# Patient Record
Sex: FEMALE | Race: WHITE | NOT HISPANIC OR LATINO | Employment: UNEMPLOYED
[De-identification: names, ages, dates, MRNs, and addresses within clinical notes are randomized per-mention and may not be internally consistent; named-entity substitution may affect disease eponyms.]

---

## 2017-01-10 ENCOUNTER — MYAURORA ACCOUNT LINK (OUTPATIENT)
Dept: OTHER | Age: 47
End: 2017-01-10

## 2017-01-10 ENCOUNTER — CHARTING TRANS (OUTPATIENT)
Dept: URGENT CARE | Age: 47
End: 2017-01-10

## 2017-05-25 ENCOUNTER — CHARTING TRANS (OUTPATIENT)
Dept: OTHER | Age: 47
End: 2017-05-25

## 2017-05-26 ENCOUNTER — CHARTING TRANS (OUTPATIENT)
Dept: FAMILY MEDICINE | Age: 47
End: 2017-05-26

## 2018-04-19 ENCOUNTER — CHARTING TRANS (OUTPATIENT)
Dept: OTHER | Age: 48
End: 2018-04-19

## 2018-04-19 ENCOUNTER — MYAURORA ACCOUNT LINK (OUTPATIENT)
Dept: OTHER | Age: 48
End: 2018-04-19

## 2018-04-19 ENCOUNTER — LAB SERVICES (OUTPATIENT)
Dept: OTHER | Age: 48
End: 2018-04-19

## 2018-04-19 LAB
A/G RATIO: 1.32 (ref 1.1–2.4)
ALANINE AMINOTRANSFE: 21 U/L (ref 7–52)
ALBUMIN, SERUM (ALB): 4.1 G/DL (ref 3.5–5.7)
ALKALINE PHOSPHATASE (ALK): 63 U/L (ref 34–104)
ANION GAP: 7 MEQ/L (ref 8–16)
ASPARTATE AMINOTRANS: 19 U/L (ref 13–39)
BASOPHIL AUTOMATED: 0.5 %
BASOPHILS: 0 (ref 0–0.2)
BILIRUBIN, TOTAL: 0.4 MG/DL (ref 0.2–0.8)
BLOOD UREA NITROGEN (BUN): 16 MG/DL (ref 7–25)
BUN/CREATININE RATIO: 20.3 (ref 7.4–23)
CALCIUM, SERUM: 10.2 MG/DL (ref 8.6–10.3)
CARBON DIOXIDE: 26 MMOL/L (ref 21–31)
CHLORIDE, SERUM: 106 MMOL/L (ref 98–107)
CREATININE: 0.79 MG/DL (ref 0.6–1.2)
EOSINOPHIL AUTOMATED: 2.2 %
EOSINOPHILS: 0.2 (ref 0–0.5)
EST GLOMERULAR FILTRATION RATE: > 60 1.73M SQ
GLUCOSE P FAST SERPL-MCNC: 108 MG/DL (ref 70–99)
HEMATOCRIT: 45.5 % (ref 34.7–45.1)
HEMOGLOBIN: 15.4 GM/DL (ref 12–15.3)
K (POTASSIUM, SERUM): 4.4 MMOL/L (ref 3.5–5.1)
LYMPHOCYTE AUTOMATED: 20.3 %
LYMPHOCYTES: 2 (ref 0.6–3.4)
MEAN CORPUSCULAR HGB: 32.8 PG (ref 26–34)
MEAN CORPUSCULAR HGB: 33.9 G/DL (ref 32.5–35.8)
MEAN CORPUSCULAR VOL: 96.9 FL (ref 80–100)
MEAN PLATELET VOLUME: 10.5 FL (ref 6.8–10.2)
MONOCYTE AUTOMATED: 9.1 %
MONOCYTES: 0.9 (ref 0.3–1)
NA (SODIUM, SERUM): 139 MMOL/L (ref 133–144)
NEUTROPHIL ABSOLUTE: 6.7 (ref 1.7–7.7)
NEUTROPHIL AUTOMATED: 67.9 %
PLATELET COUNT: 201 K/MM3 (ref 150–450)
PROTEIN TOTAL: 7.2 G/DL (ref 6.4–8.9)
RED BLOOD CELL COUNT: 4.7 M/MM3 (ref 3.63–5.04)
RED CELL DISTRIBUTIO: 13.6 % (ref 11.9–15.9)
SEDIMENTATION RATE: 17 MM/HR (ref 0–20)
WHITE BLOOD CELL COU: 9.8 K/MM3 (ref 4–11)

## 2018-04-24 ENCOUNTER — CHARTING TRANS (OUTPATIENT)
Dept: OTHER | Age: 48
End: 2018-04-24

## 2018-04-26 ENCOUNTER — ANCILLARY ORDERS (OUTPATIENT)
Dept: OTHER | Age: 48
End: 2018-04-26

## 2018-04-26 ENCOUNTER — CHARTING TRANS (OUTPATIENT)
Dept: OTHER | Age: 48
End: 2018-04-26

## 2018-04-26 ENCOUNTER — MYAURORA ACCOUNT LINK (OUTPATIENT)
Dept: OTHER | Age: 48
End: 2018-04-26

## 2018-04-26 ENCOUNTER — LAB SERVICES (OUTPATIENT)
Dept: OTHER | Age: 48
End: 2018-04-26

## 2018-04-26 DIAGNOSIS — R26.89 OTHER ABNORMALITIES OF GAIT AND MOBILITY: ICD-10-CM

## 2018-04-26 DIAGNOSIS — R20.0 ANESTHESIA OF SKIN: ICD-10-CM

## 2018-04-26 LAB
BUN SERPL-MCNC: 13 MG/DL (ref 7–20)
CALCIUM SERPL-MCNC: 10.1 MG/DL (ref 8.6–10.6)
CHLORIDE SERPL-SCNC: 104 MMOL/L (ref 96–107)
CO2 SERPL-SCNC: 28 MMOL/L (ref 22–32)
CREAT SERPL-MCNC: 0.8 MG/DL (ref 0.5–1.4)
GFR SERPL CREATININE-BSD FRML MDRD: >60 ML/MIN/{1.73M2}
GFR SERPL CREATININE-BSD FRML MDRD: >60 ML/MIN/{1.73M2}
GLUCOSE SERPL-MCNC: 98 MG/DL (ref 70–200)
POTASSIUM SERPL-SCNC: 4.6 MMOL/L (ref 3.5–5.3)
SODIUM SERPL-SCNC: 143 MMOL/L (ref 136–146)

## 2018-05-07 ENCOUNTER — OFFICE VISIT (OUTPATIENT)
Dept: NEUROLOGY | Facility: CLINIC | Age: 48
End: 2018-05-07

## 2018-05-07 VITALS
RESPIRATION RATE: 16 BRPM | BODY MASS INDEX: 40.29 KG/M2 | SYSTOLIC BLOOD PRESSURE: 140 MMHG | DIASTOLIC BLOOD PRESSURE: 76 MMHG | HEART RATE: 84 BPM | WEIGHT: 236 LBS | HEIGHT: 64 IN

## 2018-05-07 DIAGNOSIS — R25.8 CLONUS: ICD-10-CM

## 2018-05-07 DIAGNOSIS — M54.6 ACUTE MIDLINE THORACIC BACK PAIN: ICD-10-CM

## 2018-05-07 DIAGNOSIS — R29.2 HYPER REFLEXIA: ICD-10-CM

## 2018-05-07 DIAGNOSIS — G95.9 MYELOPATHY (HCC): ICD-10-CM

## 2018-05-07 DIAGNOSIS — R29.898 RIGHT LEG WEAKNESS: Primary | ICD-10-CM

## 2018-05-07 PROCEDURE — 99244 OFF/OP CNSLTJ NEW/EST MOD 40: CPT | Performed by: OTHER

## 2018-05-07 NOTE — PATIENT INSTRUCTIONS
Refill policies:    • Allow 2-3 business days for refills; controlled substances may take longer.   • Contact your pharmacy at least 5 days prior to running out of medication and have them send an electronic request or submit request through the “request re entire amount billed. Precertification and Prior Authorizations: If your physician has recommended that you have a procedure or additional testing performed.   [facility]) will contact your insurance carrier to obtain pre-certi

## 2018-05-07 NOTE — H&P
[facility] New Patient / Consult Visit    Bud Morales is a 49year old female.                          Referring MD: Tai Goldstein    Patient presents with:  New Patient  Numbness: and trouble walking       HPI:    Bud Morales is a 49 had 2   No date: KNEE SURGERY Left  Smoking status: Current Every Day Smoker                                                   Packs/day: 0.00      Years: 0.00      Smokeless tobacco: Never Used                      Alcohol use:  Yes             History rev uvula  Spinal accessory:   Shoulder Shrug: normal bilaterally   Lateral head turn: normal bilaterally  Hypoglossal:   Tongue movement: protrusion is midline with normal lateral movements    Motor System:  Strength: 5/5 throughout, except 4/5 R Hip flexion, check MRI cervical and thoracic spine with and without contrast to further evaluate for possible demyelinating disease and/or myelopathy.   In addition, will send off serologic studies for mimickers of demyelinating disease.     (R2.128) Right leg weakness FACTOR, SED RATE, WESTERGREN (AUTOMATED),         ASSAY, THYROID STIM HORMONE, VITAMIN B12,         MONOCLONAL PROTEIN STUDY        As noted above    Return in about 1 month (around 6/7/2018). Copy of note was sent to referring physician.       Júnior Torres

## 2018-05-17 ENCOUNTER — TELEPHONE (OUTPATIENT)
Dept: NEUROLOGY | Facility: CLINIC | Age: 48
End: 2018-05-17

## 2018-05-17 DIAGNOSIS — M54.6 ACUTE MIDLINE THORACIC BACK PAIN: ICD-10-CM

## 2018-05-17 DIAGNOSIS — R29.898 RIGHT LEG WEAKNESS: Primary | ICD-10-CM

## 2018-05-17 DIAGNOSIS — R25.8 CLONUS: ICD-10-CM

## 2018-05-17 DIAGNOSIS — G95.9 MYELOPATHY (HCC): ICD-10-CM

## 2018-05-17 NOTE — TELEPHONE ENCOUNTER
S: Calling with symptoms    B: Last seen 5/7 with plan to MRI brain, cervical, thoracic. MRI scheduled for 5/26.     Vale Villatoro is a 49year old female with no significant past medical history, who presents for evaluation of new onset of right lower extr

## 2018-05-22 ENCOUNTER — APPOINTMENT (OUTPATIENT)
Dept: MRI IMAGING | Facility: HOSPITAL | Age: 48
DRG: 059 | End: 2018-05-22
Attending: Other
Payer: COMMERCIAL

## 2018-05-22 ENCOUNTER — HOSPITAL ENCOUNTER (OUTPATIENT)
Dept: MRI IMAGING | Facility: HOSPITAL | Age: 48
Discharge: HOME | DRG: 059 | End: 2018-05-22
Attending: Other
Payer: COMMERCIAL

## 2018-05-22 ENCOUNTER — LAB ENCOUNTER (OUTPATIENT)
Dept: LAB | Facility: HOSPITAL | Age: 48
DRG: 059 | End: 2018-05-22
Attending: Other
Payer: COMMERCIAL

## 2018-05-22 ENCOUNTER — HOSPITAL ENCOUNTER (INPATIENT)
Dept: MRI IMAGING | Facility: HOSPITAL | Age: 48
LOS: 2 days | Discharge: HOME | DRG: 059 | End: 2018-05-25
Attending: HOSPITALIST | Admitting: HOSPITALIST
Payer: COMMERCIAL

## 2018-05-22 DIAGNOSIS — M54.6 ACUTE MIDLINE THORACIC BACK PAIN: ICD-10-CM

## 2018-05-22 DIAGNOSIS — R25.8 CLONUS: ICD-10-CM

## 2018-05-22 DIAGNOSIS — R29.898 RIGHT LEG WEAKNESS: ICD-10-CM

## 2018-05-22 DIAGNOSIS — G95.9 MYELOPATHY (HCC): ICD-10-CM

## 2018-05-22 DIAGNOSIS — G35 MULTIPLE SCLEROSIS (HCC): Primary | ICD-10-CM

## 2018-05-22 DIAGNOSIS — R29.2 HYPER REFLEXIA: ICD-10-CM

## 2018-05-22 PROCEDURE — 86431 RHEUMATOID FACTOR QUANT: CPT

## 2018-05-22 PROCEDURE — 70553 MRI BRAIN STEM W/O & W/DYE: CPT | Performed by: OTHER

## 2018-05-22 PROCEDURE — A9576 INJ PROHANCE MULTIPACK: HCPCS | Performed by: OTHER

## 2018-05-22 PROCEDURE — 84165 PROTEIN E-PHORESIS SERUM: CPT

## 2018-05-22 PROCEDURE — 85652 RBC SED RATE AUTOMATED: CPT

## 2018-05-22 PROCEDURE — 86334 IMMUNOFIX E-PHORESIS SERUM: CPT

## 2018-05-22 PROCEDURE — 83883 ASSAY NEPHELOMETRY NOT SPEC: CPT

## 2018-05-22 PROCEDURE — 84443 ASSAY THYROID STIM HORMONE: CPT

## 2018-05-22 PROCEDURE — 86225 DNA ANTIBODY NATIVE: CPT

## 2018-05-22 PROCEDURE — 86140 C-REACTIVE PROTEIN: CPT

## 2018-05-22 PROCEDURE — 86038 ANTINUCLEAR ANTIBODIES: CPT

## 2018-05-22 PROCEDURE — 36415 COLL VENOUS BLD VENIPUNCTURE: CPT

## 2018-05-22 PROCEDURE — 86235 NUCLEAR ANTIGEN ANTIBODY: CPT

## 2018-05-22 PROCEDURE — 82607 VITAMIN B-12: CPT

## 2018-05-23 ENCOUNTER — TELEPHONE (OUTPATIENT)
Dept: NEUROLOGY | Facility: CLINIC | Age: 48
End: 2018-05-23

## 2018-05-23 PROBLEM — E66.01 MORBID OBESITY (HCC): Status: ACTIVE | Noted: 2018-05-23

## 2018-05-23 PROBLEM — F17.200 TOBACCO DEPENDENCE: Status: ACTIVE | Noted: 2018-05-23

## 2018-05-23 PROCEDURE — 99223 1ST HOSP IP/OBS HIGH 75: CPT | Performed by: INTERNAL MEDICINE

## 2018-05-23 RX ORDER — ACETAMINOPHEN 325 MG/1
650 TABLET ORAL EVERY 6 HOURS PRN
Status: DISCONTINUED | OUTPATIENT
Start: 2018-05-23 | End: 2018-05-25

## 2018-05-23 RX ORDER — NICOTINE 21 MG/24HR
1 PATCH, TRANSDERMAL 24 HOURS TRANSDERMAL DAILY
Status: DISCONTINUED | OUTPATIENT
Start: 2018-05-23 | End: 2018-05-25

## 2018-05-23 RX ORDER — ALPRAZOLAM 0.25 MG/1
0.12 TABLET ORAL 2 TIMES DAILY PRN
Status: DISCONTINUED | OUTPATIENT
Start: 2018-05-23 | End: 2018-05-25

## 2018-05-23 RX ORDER — ENOXAPARIN SODIUM 100 MG/ML
0.5 INJECTION SUBCUTANEOUS DAILY
Status: DISCONTINUED | OUTPATIENT
Start: 2018-05-23 | End: 2018-05-25

## 2018-05-23 RX ORDER — ENOXAPARIN SODIUM 100 MG/ML
40 INJECTION SUBCUTANEOUS DAILY
Status: DISCONTINUED | OUTPATIENT
Start: 2018-05-23 | End: 2018-05-23

## 2018-05-23 RX ORDER — ALPRAZOLAM 0.25 MG/1
0.25 TABLET ORAL 3 TIMES DAILY PRN
COMMUNITY

## 2018-05-23 RX ADMIN — NICOTINE 1 PATCH: 21 MG/24HR PATCH, TRANSDERMAL 24 HOURS TRANSDERMAL at 21:17:00

## 2018-05-23 NOTE — TELEPHONE ENCOUNTER
Per Dr. Beverley Ross: MRIs are suggestive of possible demyelinating disease but also show some tightness in cervical cord. Recommending direct admission to address both possible neurological diagnosis as well as neurosurgerical consult for evaluation.

## 2018-05-23 NOTE — TELEPHONE ENCOUNTER
Marsha Salinas informed of results and recommendations. She is interested in admission to complete workup and initiate treatment, but needs to discuss with her family before committing to plan. She will call back to let office know of her decision.

## 2018-05-23 NOTE — TELEPHONE ENCOUNTER
Call Registration at [phone] to confirm patient's insurance coverage. For Non [facility] patient, [facility], Dr. Monse shafer ([phone]) & informed of direct admission for MS. Orders given.   Nursing Supervisor (ext [phone]), Jonathan Oliveros RN noti

## 2018-05-23 NOTE — PROGRESS NOTES
[address] [place] [address] with [facility]  5/23/2018    5:11 PM      Patient of my associated Dr Dom Ahumada who has abnormal MRI brain and cervical cord, demyelinating disease is suspected and she will receive solumedr

## 2018-05-23 NOTE — H&P
[facility]  History and Physical     Nadeem Cage Sula Patient Status:  Inpatient    7/15/1970 MRN [id number]   [facility] 7NE-A Attending Nora Perkins [address] Day # 0 PCP [facility] SPECIALTY [facility]     Chief Complaint: right leg /67 (BP Location: Left arm)   Pulse 75   Temp 98.9 °F (37.2 °C) (Oral)   Resp 18   LMP 05/07/2018   SpO2 99%   General: No acute distress. Alert and oriented x 3. HEENT: Normocephalic atraumatic. Moist mucous membranes. EOM-I. PERRLA. Anicteric.

## 2018-05-24 ENCOUNTER — APPOINTMENT (OUTPATIENT)
Dept: GENERAL RADIOLOGY | Facility: HOSPITAL | Age: 48
DRG: 059 | End: 2018-05-24
Attending: Other
Payer: COMMERCIAL

## 2018-05-24 PROCEDURE — 009U3ZX DRAINAGE OF SPINAL CANAL, PERCUTANEOUS APPROACH, DIAGNOSTIC: ICD-10-PCS | Performed by: RADIOLOGY

## 2018-05-24 PROCEDURE — 99253 IP/OBS CNSLTJ NEW/EST LOW 45: CPT | Performed by: NEUROLOGICAL SURGERY

## 2018-05-24 PROCEDURE — 99254 IP/OBS CNSLTJ NEW/EST MOD 60: CPT | Performed by: OTHER

## 2018-05-24 PROCEDURE — 77003 FLUOROGUIDE FOR SPINE INJECT: CPT | Performed by: OTHER

## 2018-05-24 PROCEDURE — 99232 SBSQ HOSP IP/OBS MODERATE 35: CPT | Performed by: INTERNAL MEDICINE

## 2018-05-24 PROCEDURE — 62270 DX LMBR SPI PNXR: CPT | Performed by: OTHER

## 2018-05-24 RX ORDER — PANTOPRAZOLE SODIUM 40 MG/1
40 TABLET, DELAYED RELEASE ORAL
Status: DISCONTINUED | OUTPATIENT
Start: 2018-05-24 | End: 2018-05-25

## 2018-05-24 RX ORDER — ERGOCALCIFEROL 1.25 MG/1
50000 CAPSULE ORAL
Status: DISCONTINUED | OUTPATIENT
Start: 2018-05-24 | End: 2018-05-25

## 2018-05-24 RX ADMIN — ERGOCALCIFEROL 50000 UNITS: 1.25 CAPSULE ORAL at 16:42:00

## 2018-05-24 RX ADMIN — NICOTINE 1 PATCH: 21 MG/24HR PATCH, TRANSDERMAL 24 HOURS TRANSDERMAL at 09:04:00

## 2018-05-24 RX ADMIN — PANTOPRAZOLE SODIUM 40 MG: 40 TABLET, DELAYED RELEASE ORAL at 14:38:00

## 2018-05-24 NOTE — PLAN OF CARE
Assumed care. Patient resting comfortably. Denies pain. IV steriod injection infused. Tolerated well. Plan for LP today. Will monitor. No issues overnight.      Impaired Activities of Daily Living    • Achieve highest/safest level of independence in self ca

## 2018-05-24 NOTE — PROGRESS NOTES
[facility] Progress Note:  Anticoagulation Weight Dose Adjustment for enoxaparin (LOVENOX)    Kianna Monroy is a 49year old female who has been prescribed enoxaparin (LOVENOX) for VTE prophylaxis.       Estimated Creatinine Clearance: 73.3 mL/min

## 2018-05-24 NOTE — CM/SW NOTE
05/24/18 1100   CM/SW Screening   Referral Source Social Work (self-referral)     Patient was screened during rounds and no needs are identified at this time. RN to contact SW/CM if needs arise.

## 2018-05-24 NOTE — PAYOR COMM NOTE
--------------  ADMISSION REVIEW     Payor: Jerzy Murphy  #:  [id number]  Authorization Number: [id number]    Admit date: 5/23/18  Admit time: 200       Admitting Physician: Cheryl Da Silva DO  Attending Physician:  Liudmila Ramos extremities. ASSESSMENT / PLAN:     1. Right leg weakness- concerning for demyelinating disease / MS  1. Will need to place on solumedrol- dosing per neurology   2. LP? For further work up   3. Fall precaution   4. PT OT   5. Neurology cosnult   2.  [facility] forearm have improved and that RLE is stronger.        Objective/Physical Exam:     Vital Signs:  Blood pressure 156/84, pulse 105, temperature 98.2 °F (36.8 °C), temperature source Oral, resp.  rate 16, weight 236 lb, last menstrual period 05/07/2018, SpO2 nicotine (NICODERM CQ) 14 MG/24HR 1 patch     Date Action Dose Route User    5/24/2018 0904 Patch Applied 1 patch Transdermal (Right Upper Arm) Noah Blanco RN    5/23/2018 2117 Patch Applied 1 patch Transdermal (Left Upper Arm) Naheed Rivers

## 2018-05-24 NOTE — CONSULTS
[address] [facility] with [facility]  5/24/2018    1:03 PM      50 yo RHF with abnormal MRI brain and cervical cord    HPI:   Around March, she began having burning in the arms and thought nothing of it but then /74 (BP Location: Left arm)   Pulse 87   Temp 97.9 °F (36.6 °C) (Oral)   Resp 18   Wt 236 lb   LMP 05/07/2018   SpO2 97%   BMI 40.51 kg/m²    Appears stated age  HENT:  Pink conjunctiva, anicteric sclerae, moist mucosa  Neck: No adenopathy or thyrome

## 2018-05-24 NOTE — CONSULTS
[facility]  Neurosurgery Consult    [address] Patient Status:  Inpatient    7/15/1970 MRN [id number]   [facility] 7NE-A Attending Adrián Yi  [address] Day # 1 PCP ELIZABETH GARCÍA     REASON FOR CONSULTATION:  Cervica MethylPREDNISolone Sodium Succ (Solu-MEDROL) 500 mg in sodium chloride 0.9 % 100 mL IVPB 500 mg Intravenous QAM   Enoxaparin Sodium (LOVENOX) 60 MG/0.6ML injection 50 mg 0.5 mg/kg Subcutaneous Daily   nicotine (NICODERM CQ) 14 MG/24HR 1 patch 1 patch Tra enhancement. MRI C/T Spine -     Multiple high T2 signal lesions intrinsic to the cervical spinal cord and upper thoracic spinal cord at the C2 through C4 level and C6-7 level as well as the T1 -2 level posteriorly and T8-9 level.   There is some cord en to both her demyelinating process and the evidence of injury to her spinal cord. I emphasized the fact that we cannot predict if and when she will deteriorate or at what rate, but that she is at risk for loss of function.  She has a risk of devastating outc

## 2018-05-24 NOTE — PROGRESS NOTES
[facility]  Progress Note     Pieter Mancilla Patient Status:  Inpatient    7/15/1970 MRN [id number]   [facility] 7NE-A Attending Braulio Jean [address] Day # 1 PCP [facility]     Chief Complaint: right leg weakness weakness- concerning for demyelinating disease / MS  1.  on solumedrol  2. LP For further work up   3. Fall precaution   4. PT OT   5. Neurology recs appreciated    2. Tobacco dependence - tobacco cessation advsied  3.  Probable DEBBIE- sleep study as outpt

## 2018-05-24 NOTE — PROGRESS NOTES
[address]  Neurology Progress Note    Karlo Mancilla Patient Status:  Inpatient    7/15/1970 MRN [id number]   [facility] 7NE-A Attending Ermelinda Thomas [address] Day # 1 PCP ELIZABETH GARCÍA         Subjective:  Armani Dutta small vessel ischemic changes. Largest lesion in the left frontal lobe periventricular white matter measures 7.5 mm. No abnormal enhancement. Large retention cyst right maxillary sinus.             MRI C-Spine 5/22/18:  CONCLUSION:  Multiple high T2 s

## 2018-05-24 NOTE — PROCEDURES
[facility]  Procedure Note    Cornelio Mancilla Patient Status:  Inpatient    7/15/1970 MRN [id number]   [facility] 7NE-A Attending Carmen López*   Hosp Day # 1 PCP ELIZABETH GARCÍA     Procedure: Lumbar Puncture      Anesthe

## 2018-05-25 VITALS
OXYGEN SATURATION: 98 % | RESPIRATION RATE: 18 BRPM | WEIGHT: 236 LBS | HEART RATE: 78 BPM | TEMPERATURE: 98 F | SYSTOLIC BLOOD PRESSURE: 149 MMHG | DIASTOLIC BLOOD PRESSURE: 64 MMHG | BODY MASS INDEX: 41 KG/M2

## 2018-05-25 PROCEDURE — 99232 SBSQ HOSP IP/OBS MODERATE 35: CPT | Performed by: OTHER

## 2018-05-25 PROCEDURE — 99239 HOSP IP/OBS DSCHRG MGMT >30: CPT | Performed by: INTERNAL MEDICINE

## 2018-05-25 RX ORDER — LOSARTAN POTASSIUM 25 MG/1
25 TABLET ORAL DAILY
Status: DISCONTINUED | OUTPATIENT
Start: 2018-05-25 | End: 2018-05-25

## 2018-05-25 RX ORDER — LOSARTAN POTASSIUM 25 MG/1
25 TABLET ORAL DAILY
Qty: 30 TABLET | Refills: 0 | Status: SHIPPED | OUTPATIENT
Start: 2018-05-26 | End: 2018-06-07

## 2018-05-25 RX ORDER — ERGOCALCIFEROL (VITAMIN D2) 1250 MCG
50000 CAPSULE ORAL
Qty: 4 CAPSULE | Refills: 0 | Status: SHIPPED | OUTPATIENT
Start: 2018-05-31 | End: 2018-08-13 | Stop reason: ALTCHOICE

## 2018-05-25 RX ORDER — PREDNISONE 20 MG/1
TABLET ORAL
Qty: 30 TABLET | Refills: 0 | Status: SHIPPED | OUTPATIENT
Start: 2018-05-25 | End: 2018-06-13

## 2018-05-25 RX ADMIN — NICOTINE 1 PATCH: 21 MG/24HR PATCH, TRANSDERMAL 24 HOURS TRANSDERMAL at 09:30:00

## 2018-05-25 RX ADMIN — PANTOPRAZOLE SODIUM 40 MG: 40 TABLET, DELAYED RELEASE ORAL at 07:13:00

## 2018-05-25 RX ADMIN — LOSARTAN POTASSIUM 25 MG: 25 TABLET ORAL at 09:25:00

## 2018-05-25 NOTE — PLAN OF CARE
Patient awake, alert and oriented x4  Telemetry, Sinus Rhythm   Currently denies pain/discomfort  RLE; right thigh weakness/slight numbness/tingling- per patient this is improved since admission  Pedal pulses palpable   IV steroid as ordered  Working with

## 2018-05-25 NOTE — PHYSICAL THERAPY NOTE
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 Pt reports recently she has been crawling up the stairs and scooting down on her bottom. SUBJECTIVE  \"I definitely feel like I can walk better with this walker. \"    Patient self-stated goal is to return home.      OBJECTIVE  P Gait Assistance: Supervision  Distance (ft): 200  Assistive Device: Rolling walker  Pattern: Comment (quick yaneli)  Stoop/Curb Assistance: Not tested       Skilled Therapy Provided: Session approved by RN.  Pt received supine in bed, agreeable to therap impairments and comorbidities manifest themselves as functional limitations in independent bed mobility, transfers, and gait.   The patient is below baseline and would benefit from skilled inpatient PT to address the above deficits to assist patient in retu

## 2018-05-25 NOTE — PROGRESS NOTES
[address] [address] Neurology Progress Note    Obdulia Mancilla Patient Status:  Inpatient    7/15/1970 MRN [id number]   [facility] 7NE-A Attending Delories Seip [address] Day # 2 PCP ELIZABETH GARCÍA         Subjective:  Abrahan Moreno There are several foci of FLAIR and T2 signal abnormality in the periventricular white matter both cerebral hemispheres.  This is in the distribution compatible with demyelinating disease.  Differential includes postinflammatory etiology and chronic   smal Likely d/c today after last steroid dose with Prednisone taper 60/40/20mg daily each x 5 days  Vit D supplementation   Follow up with Dr. Inocencio de leon for further MS management and to follow up on LP results    Dr. Red Sabillon to follow.     Birdie Limon

## 2018-05-25 NOTE — PROGRESS NOTES
[facility]  FCO Progress Note    Viviana Mancilla Patient Status:  Inpatient    7/15/1970 MRN [id number]   [facility] 7NE-A Attending Nelly Phelps MD   Hosp Day # 2 PCP [facility]       Subjective: No new symptoms today, has don

## 2018-05-25 NOTE — OCCUPATIONAL THERAPY NOTE
OCCUPATIONAL THERAPY QUICK EVALUATION - INPATIENT    Room Number: 8321/0275-X  Evaluation Date: 5/25/2018     Type of Evaluation: Quick Eval  Presenting Problem: myelopathy    Physician Order: IP Consult to Occupational Therapy  Reason for Therapy:  ADL/IA [facility])  Active Problems:    Right leg weakness    Tobacco dependence    Morbid obesity ([facility]    Multiple sclerosis ([facility]    Essential hypertension    Vitamin D deficiency    Cervical stenosis of spine      Past Medical History  Past Medical History:   Diagn ASSESSMENT  AM-PAC ‘6-Clicks’ Inpatient Daily Activity Short Form   How much help from another person does the patient currently need…  -   Putting on and taking off regular lower body clothing?: None   -   Bathing (including washing, rinsing, drying)?: No presents with no skilled Occupational Therapy needs at this time. Patient discharged from Occupational Therapy services. Please re-order if a new functional limitation presents during this admission.     Patient was able to achieve the following goals:  P

## 2018-05-25 NOTE — DISCHARGE SUMMARY
[facility]  DISCHARGE SUMMARY     Marleni Mancilla Patient Status:  Inpatient    7/15/1970 MRN [id number]   [facility] 7NE-A Attending Mylene Loja MD   Hosp Day # 2 PCP Rahel Ramos     Date of Admission: 2018  Date of Malia Terry Consultants:  • Neurology, spine surgery     Discharge Medication List:     Discharge Medications      START taking these medications      Instructions Prescription details   ergocalciferol 48651 units Caps  Commonly known as:  ERGOCALCIFEROL  Start gallops. Abdomen: Soft, nontender, nondistended. Positive bowel sounds. No rebound or guarding. Neurologic: +4/5 motor on right LE  Musculoskeletal: Moves all extremities. Extremities: No edema.   -------------------------------------------------------

## 2018-05-25 NOTE — PLAN OF CARE
NURSING DISCHARGE NOTE    Discharged Home via Wheelchair. Accompanied by Spouse and Support staff  Belongings Taken by patient/family. AVS Discharge paperwork completed and reviewed with patient.  Reinforced importance of making and attending follow

## 2018-05-25 NOTE — PLAN OF CARE
Assumed care. Patient resting in bed with friends at bedside. Feeling better. No more nausea and numbness/weakness is slightly improving. She is aware of plan of care, including another steriod infusion this am. Plan for discharge after infusion completed.

## 2018-05-29 NOTE — PAYOR COMM NOTE
--------------  DISCHARGE REVIEW    Payor: Jerzy Jeffrey  #:  [id number]  Authorization Number: [id number]     Admit date: 5/23/18  Admit time:  0553  Discharge Date: 5/25/2018  2:24 PM     Admitting Physician: Kirsty Barton DO

## 2018-05-30 ENCOUNTER — TELEPHONE (OUTPATIENT)
Dept: NEUROLOGY | Facility: CLINIC | Age: 48
End: 2018-05-30

## 2018-05-30 RX ORDER — TIZANIDINE 2 MG/1
2 TABLET ORAL 2 TIMES DAILY
Qty: 60 TABLET | Refills: 5 | Status: SHIPPED | OUTPATIENT
Start: 2018-05-30 | End: 2018-08-13 | Stop reason: ALTCHOICE

## 2018-05-30 NOTE — TELEPHONE ENCOUNTER
Spoke with patient and she relates that tightness in the head  sometimes responds to Xanax. I will send tizanidine 2 mg twice a day as maintenance. Advised to keep follow-up appointment to discuss disease modifying treatment.

## 2018-05-30 NOTE — TELEPHONE ENCOUNTER
S: Calling with condition update after discharge from hospital    B: Admitted to [facility] for diganostic LP, solumedrol 500 mg x 3 days with oral taper (she is on day # 5 of taper, 60 mg / day).      A: She has seen improvement in her symptoms; walking is better

## 2018-06-07 ENCOUNTER — OFFICE VISIT (OUTPATIENT)
Dept: NEUROLOGY | Facility: CLINIC | Age: 48
End: 2018-06-07

## 2018-06-07 VITALS
SYSTOLIC BLOOD PRESSURE: 133 MMHG | WEIGHT: 232 LBS | HEIGHT: 64 IN | BODY MASS INDEX: 39.61 KG/M2 | RESPIRATION RATE: 16 BRPM | HEART RATE: 83 BPM | DIASTOLIC BLOOD PRESSURE: 60 MMHG

## 2018-06-07 DIAGNOSIS — G95.9 MYELOPATHY (HCC): Primary | ICD-10-CM

## 2018-06-07 DIAGNOSIS — G37.9 DEMYELINATING DISEASE (HCC): ICD-10-CM

## 2018-06-07 PROCEDURE — 99214 OFFICE O/P EST MOD 30 MIN: CPT | Performed by: OTHER

## 2018-06-07 NOTE — PROGRESS NOTES
[facility] Progress Note    HPI    Patient presents with:  Numbness:  Follow up after labs      As per my initial H&P from 5/7/18:,   \" Zohra De León is a 49year old, who presents for evaluation of numbness and tingling and walking iss Since last visit, she was admitted for workup after having worsening walking and found to have multiple demyelinating lesions as well as possible spinal cord compression.   Patient was treated with IV solumedrol and seen by neurosurgery regarding possibl Caffeine Concern        Yes    Comment:2-3 a day sodas  Exercise                No        No Known Allergies      Current Outpatient Prescriptions:   •  TiZANidine HCl 2 MG Oral Tab, Take 1 tablet (2 mg total) by mouth 2 (two) times daily. , Disp: 60 tablet Coord: FNF intact with no tremor or dysmetria; rapid alternating movements intact bilaterally  Romberg: absent  Gait: slow cautious, walks with R leg stiff and dragging and using walker to walk    Labs:  New since last visit:    CSF: 5/24/18  Protein 50.8 C5-C6:  No significant disc/facet abnormality, spinal stenosis, or foraminal narrowing. C6-C7:  There is a broad based disc osteophyte complex lateralizing to the right.   There is moderate to severe right neural foraminal narrowing and mild left neural fo CONCLUSION:  Multiple high T2 signal lesions intrinsic to the cervical spinal cord and upper thoracic spinal cord at the C2 through C4 level and C6-7 level as well as the T1 -2 level posteriorly and T8-9 level.   There is some cord enhancement at the C6-7 CSF appears consistent with demyelinating disease.   However, the C6/7 level appears to have cord edema likely related to compressive etiology - given these findings, recommend she first be seen by neurosurgery and await recommendations for possible surg

## 2018-06-07 NOTE — PATIENT INSTRUCTIONS
See neurosurgery as directed  Follow up after seeing them ~ 1-2 months, pending timing of surgery     Refill policies:    • Allow 2-3 business days for refills; controlled substances may take longer.   • Contact your pharmacy at least 5 days prior to faisal determine coverage. If test is done without insurance authorization, patient may be responsible for the entire amount billed. Precertification and Prior Authorizations:   If your physician has recommended that you have a procedure or additional testin

## 2018-06-11 ENCOUNTER — OFFICE VISIT (OUTPATIENT)
Dept: SURGERY | Facility: CLINIC | Age: 48
End: 2018-06-11

## 2018-06-11 ENCOUNTER — TELEPHONE (OUTPATIENT)
Dept: SURGERY | Facility: CLINIC | Age: 48
End: 2018-06-11

## 2018-06-11 VITALS
SYSTOLIC BLOOD PRESSURE: 135 MMHG | HEART RATE: 82 BPM | DIASTOLIC BLOOD PRESSURE: 60 MMHG | HEIGHT: 64 IN | WEIGHT: 232 LBS | BODY MASS INDEX: 39.61 KG/M2

## 2018-06-11 DIAGNOSIS — G95.9 MYELOPATHY (HCC): Primary | ICD-10-CM

## 2018-06-11 DIAGNOSIS — G95.9 CERVICAL MYELOPATHY (HCC): Primary | ICD-10-CM

## 2018-06-11 PROCEDURE — 99214 OFFICE O/P EST MOD 30 MIN: CPT | Performed by: NEUROLOGICAL SURGERY

## 2018-06-11 NOTE — PATIENT INSTRUCTIONS
Refill policies:    • Allow 2-3 business days for refills; controlled substances may take longer.   • Contact your pharmacy at least 5 days prior to running out of medication and have them send an electronic request or submit request through the “request re entire amount billed. Precertification and Prior Authorizations: If your physician has recommended that you have a procedure or additional testing performed.   [facility]) will contact your insurance carrier to obtain pre-certi chest xray and EKG if needed. You will be informed about a spine class as it related to your surgery. Although the class is not mandatory, your are strongly encouraged to attend. Information for the spine class is provided below.    · The blood work will  i Monday through Friday   ·                           8am-5pm  · If you were on blood thinners (such as Coumadin, Pradaxa, Xarelto, etc) prior to surgery that we had you stop for surgery, please make sure you get instructions about when to resume the [facility]

## 2018-06-11 NOTE — PROGRESS NOTES
Location of Pain: Pt states numbness in bilateral legs bilateral arms bilateral hands bilateral feet. Pt states that stomach contracts constantly. Pt states weakness in the right leg while walking. No B/B issues. Pt states this occurred in March of 2018.

## 2018-06-11 NOTE — TELEPHONE ENCOUNTER
You are scheduled for Cervical 3-4, cervical 6-7 anterior cervical discectomy and fusion on 6/21/18 with .     Pre-op instructions discussed with patient and surgical packet provided:     · You will need to contact the Pre-admission department at office for appointment. · You may need an Aspen cervical collar. If ordered for your surgery, the vendor is  [employer], you will need to contact them for a fitting. We will provide you with the contact information for these vendors.   · [id number] [address]

## 2018-06-12 NOTE — PROGRESS NOTES
[facility]  Neurosurgery Clinic Visit    HISTORY OF PRESENT ILLNESS:  Fadia Domínguez is a(n) 49year old female previously seen in hospital consultation.  She was admitted and diagnosed with MS, also found to have concurrent cervic with + Childers's on the right and B/L Clonus Right > Left     DIAGNOSTIC DATA:      IMAGING:  MRI cervical reviewed, severe stenosis with possible cord signal change at C3-4 and C6-7.      ASSESSMENT:  48 yo F with newly diagnosed MS, concurrent severe cerv

## 2018-06-13 ENCOUNTER — CHARTING TRANS (OUTPATIENT)
Dept: OTHER | Age: 48
End: 2018-06-13

## 2018-06-13 NOTE — TELEPHONE ENCOUNTER
No pre-authorization is needed for outpatient surgery as long as 23 hour observation stay is not exceeded.    Ref # [id number] Sanchez 6/13/18 10:22am (ET)

## 2018-06-14 NOTE — TELEPHONE ENCOUNTER
Patient has appointment for medical clearance tomorrow 6/15/18. Will check back on clearance next week.

## 2018-06-15 ENCOUNTER — LAB SERVICES (OUTPATIENT)
Dept: OTHER | Age: 48
End: 2018-06-15

## 2018-06-15 ENCOUNTER — CHARTING TRANS (OUTPATIENT)
Dept: OTHER | Age: 48
End: 2018-06-15

## 2018-06-15 ENCOUNTER — IMAGING SERVICES (OUTPATIENT)
Dept: OTHER | Age: 48
End: 2018-06-15

## 2018-06-15 ENCOUNTER — ANCILLARY ORDERS (OUTPATIENT)
Dept: OTHER | Age: 48
End: 2018-06-15

## 2018-06-15 ENCOUNTER — MYAURORA ACCOUNT LINK (OUTPATIENT)
Dept: OTHER | Age: 48
End: 2018-06-15

## 2018-06-15 DIAGNOSIS — Z12.31 ENCOUNTER FOR SCREENING MAMMOGRAM FOR MALIGNANT NEOPLASM OF BREAST: ICD-10-CM

## 2018-06-15 LAB
ALBUMIN SERPL-MCNC: 3.6 G/DL (ref 3.6–5.1)
ALP SERPL-CCNC: 68 U/L (ref 45–130)
ALT SERPL-CCNC: 30 U/L (ref 15–43)
AST SERPL-CCNC: 19 U/L (ref 14–43)
BASOPHIL %: 0.2 % (ref 0–1.2)
BASOPHIL ABSOLUTE #: 0 10*3/UL (ref 0–0.1)
BILIRUB SERPL-MCNC: 0.8 MG/DL (ref 0–1.3)
BUN SERPL-MCNC: 13 MG/DL (ref 7–20)
CALCIUM SERPL-MCNC: 9.7 MG/DL (ref 8.6–10.6)
CHLORIDE SERPL-SCNC: 108 MMOL/L (ref 96–107)
CO2 SERPL-SCNC: 24 MMOL/L (ref 22–32)
CREAT SERPL-MCNC: 0.7 MG/DL (ref 0.5–1.4)
DIFFERENTIAL TYPE: ABNORMAL
EOSINOPHIL %: 1.7 % (ref 0–10)
EOSINOPHIL ABSOLUTE #: 0.2 10*3/UL (ref 0–0.5)
GFR SERPL CREATININE-BSD FRML MDRD: >60 ML/MIN/{1.73M2}
GFR SERPL CREATININE-BSD FRML MDRD: >60 ML/MIN/{1.73M2}
GLUCOSE P FAST SERPL-MCNC: 112 MG/DL (ref 60–100)
HEMATOCRIT: 44.2 % (ref 34–45)
HEMOGLOBIN: 15.6 G/DL (ref 11.2–15.7)
INTERNATIONAL NORMALIZED RATIO: 1
LYMPH PERCENT: 21.6 % (ref 20.5–51.1)
LYMPHOCYTE ABSOLUTE #: 2.2 10*3/UL (ref 1.2–3.4)
MEAN CORPUSCULAR HGB CONCENTRATION: 35.3 % (ref 32–36)
MEAN CORPUSCULAR HGB: 34 PG (ref 27–34)
MEAN CORPUSCULAR VOLUME: 96.3 FL (ref 79–95)
MEAN PLATELET VOLUME: 11.3 FL (ref 8.6–12.4)
MONOCYTE ABSOLUTE #: 0.8 10*3/UL (ref 0.2–0.9)
MONOCYTE PERCENT: 8.1 % (ref 4.3–12.9)
NEUTROPHIL ABSOLUTE #: 6.9 10*3/UL (ref 1.4–6.5)
NEUTROPHIL PERCENT: 68.4 % (ref 34–73.5)
PLATELET COUNT: 145 10*3/UL (ref 150–400)
POTASSIUM SERPL-SCNC: 4.6 MMOL/L (ref 3.5–5.3)
PROT SERPL-MCNC: 6.4 G/DL (ref 6.4–8.5)
PROTHROMBIN TIME: 10 S (ref 9.5–11.5)
PTT: 25.9 S (ref 24–38)
RED BLOOD CELL COUNT: 4.59 10*6/UL (ref 3.7–5.2)
RED CELL DISTRIBUTION WIDTH: 13.8 % (ref 11.3–14.8)
SODIUM SERPL-SCNC: 142 MMOL/L (ref 136–146)
WHITE BLOOD CELL COUNT: 10.1 10*3/UL (ref 4–10)

## 2018-06-15 NOTE — TELEPHONE ENCOUNTER
Medical Clearance received from DR. Maldonado-Counts, \"aptient is in optimal condition for planned procedure\"  Clearance faxed to pre-admissions    Nothing further needed for surgery.

## 2018-06-18 LAB — FINAL REPORT: NORMAL

## 2018-06-19 ENCOUNTER — CHARTING TRANS (OUTPATIENT)
Dept: OTHER | Age: 48
End: 2018-06-19

## 2018-06-20 ENCOUNTER — ANESTHESIA EVENT (OUTPATIENT)
Dept: SURGERY | Facility: HOSPITAL | Age: 48
End: 2018-06-20
Payer: COMMERCIAL

## 2018-06-21 ENCOUNTER — APPOINTMENT (OUTPATIENT)
Dept: GENERAL RADIOLOGY | Facility: HOSPITAL | Age: 48
End: 2018-06-21
Attending: NEUROLOGICAL SURGERY
Payer: COMMERCIAL

## 2018-06-21 ENCOUNTER — SURGERY (OUTPATIENT)
Age: 48
End: 2018-06-21

## 2018-06-21 ENCOUNTER — HOSPITAL ENCOUNTER (OUTPATIENT)
Facility: HOSPITAL | Age: 48
Setting detail: OBSERVATION
Discharge: HOME | End: 2018-06-22
Attending: NEUROLOGICAL SURGERY | Admitting: NEUROLOGICAL SURGERY
Payer: COMMERCIAL

## 2018-06-21 ENCOUNTER — ANESTHESIA (OUTPATIENT)
Dept: SURGERY | Facility: HOSPITAL | Age: 48
End: 2018-06-21
Payer: COMMERCIAL

## 2018-06-21 DIAGNOSIS — M48.02 CERVICAL STENOSIS OF SPINE: Primary | ICD-10-CM

## 2018-06-21 DIAGNOSIS — G95.9 MYELOPATHY (HCC): ICD-10-CM

## 2018-06-21 PROCEDURE — 0RG10K0 FUSION OF CERVICAL VERTEBRAL JOINT WITH NONAUTOLOGOUS TISSUE SUBSTITUTE, ANTERIOR APPROACH, ANTERIOR COLUMN, OPEN APPROACH: ICD-10-PCS | Performed by: NEUROLOGICAL SURGERY

## 2018-06-21 PROCEDURE — 0RG10A0 FUSION OF CERVICAL VERTEBRAL JOINT WITH INTERBODY FUSION DEVICE, ANTERIOR APPROACH, ANTERIOR COLUMN, OPEN APPROACH: ICD-10-PCS | Performed by: NEUROLOGICAL SURGERY

## 2018-06-21 PROCEDURE — 76001 XR FLUOROSCOPE EXAM >1 HR EXTENSIVE (CPT=76001): CPT | Performed by: NEUROLOGICAL SURGERY

## 2018-06-21 DEVICE — VARIABLE, SELF-DRILLING SCREW
Type: IMPLANTABLE DEVICE | Site: NECK | Status: FUNCTIONAL
Brand: AVIATOR

## 2018-06-21 DEVICE — ONE-LEVEL ANTERIOR PLATE
Type: IMPLANTABLE DEVICE | Site: NECK | Status: FUNCTIONAL
Brand: AVIATOR

## 2018-06-21 RX ORDER — BISACODYL 10 MG
10 SUPPOSITORY, RECTAL RECTAL
Status: DISCONTINUED | OUTPATIENT
Start: 2018-06-21 | End: 2018-06-22

## 2018-06-21 RX ORDER — ALPRAZOLAM 0.25 MG/1
0.25 TABLET ORAL 3 TIMES DAILY PRN
Status: DISCONTINUED | OUTPATIENT
Start: 2018-06-21 | End: 2018-06-22

## 2018-06-21 RX ORDER — ACETAMINOPHEN 325 MG/1
650 TABLET ORAL EVERY 4 HOURS PRN
Status: DISCONTINUED | OUTPATIENT
Start: 2018-06-21 | End: 2018-06-22

## 2018-06-21 RX ORDER — HYDROMORPHONE HYDROCHLORIDE 1 MG/ML
0.2 INJECTION, SOLUTION INTRAMUSCULAR; INTRAVENOUS; SUBCUTANEOUS EVERY 2 HOUR PRN
Status: DISCONTINUED | OUTPATIENT
Start: 2018-06-21 | End: 2018-06-22

## 2018-06-21 RX ORDER — SODIUM PHOSPHATE, DIBASIC AND SODIUM PHOSPHATE, MONOBASIC 7; 19 G/133ML; G/133ML
1 ENEMA RECTAL ONCE AS NEEDED
Status: DISCONTINUED | OUTPATIENT
Start: 2018-06-21 | End: 2018-06-22

## 2018-06-21 RX ORDER — CEFAZOLIN SODIUM/WATER 2 G/20 ML
2 SYRINGE (ML) INTRAVENOUS EVERY 8 HOURS
Status: COMPLETED | OUTPATIENT
Start: 2018-06-22 | End: 2018-06-22

## 2018-06-21 RX ORDER — METOCLOPRAMIDE HYDROCHLORIDE 5 MG/ML
10 INJECTION INTRAMUSCULAR; INTRAVENOUS EVERY 6 HOURS PRN
Status: DISCONTINUED | OUTPATIENT
Start: 2018-06-21 | End: 2018-06-22

## 2018-06-21 RX ORDER — BACITRACIN 50000 [USP'U]/1
INJECTION, POWDER, LYOPHILIZED, FOR SOLUTION INTRAMUSCULAR AS NEEDED
Status: DISCONTINUED | OUTPATIENT
Start: 2018-06-21 | End: 2018-06-21 | Stop reason: HOSPADM

## 2018-06-21 RX ORDER — POLYETHYLENE GLYCOL 3350 17 G/17G
17 POWDER, FOR SOLUTION ORAL DAILY PRN
Status: DISCONTINUED | OUTPATIENT
Start: 2018-06-21 | End: 2018-06-22

## 2018-06-21 RX ORDER — HYDROCODONE BITARTRATE AND ACETAMINOPHEN 5; 325 MG/1; MG/1
1 TABLET ORAL AS NEEDED
Status: DISCONTINUED | OUTPATIENT
Start: 2018-06-21 | End: 2018-06-21 | Stop reason: HOSPADM

## 2018-06-21 RX ORDER — CEFAZOLIN SODIUM/WATER 2 G/20 ML
2 SYRINGE (ML) INTRAVENOUS ONCE
Status: COMPLETED | OUTPATIENT
Start: 2018-06-21 | End: 2018-06-21

## 2018-06-21 RX ORDER — MEPERIDINE HYDROCHLORIDE 25 MG/ML
12.5 INJECTION INTRAMUSCULAR; INTRAVENOUS; SUBCUTANEOUS AS NEEDED
Status: DISCONTINUED | OUTPATIENT
Start: 2018-06-21 | End: 2018-06-21 | Stop reason: HOSPADM

## 2018-06-21 RX ORDER — SODIUM CHLORIDE, SODIUM LACTATE, POTASSIUM CHLORIDE, CALCIUM CHLORIDE 600; 310; 30; 20 MG/100ML; MG/100ML; MG/100ML; MG/100ML
INJECTION, SOLUTION INTRAVENOUS CONTINUOUS
Status: DISCONTINUED | OUTPATIENT
Start: 2018-06-21 | End: 2018-06-21 | Stop reason: HOSPADM

## 2018-06-21 RX ORDER — ACETAMINOPHEN 500 MG
1000 TABLET ORAL ONCE
Status: DISCONTINUED | OUTPATIENT
Start: 2018-06-21 | End: 2018-06-22

## 2018-06-21 RX ORDER — LIDOCAINE HYDROCHLORIDE AND EPINEPHRINE 10; 10 MG/ML; UG/ML
INJECTION, SOLUTION INFILTRATION; PERINEURAL AS NEEDED
Status: DISCONTINUED | OUTPATIENT
Start: 2018-06-21 | End: 2018-06-21 | Stop reason: HOSPADM

## 2018-06-21 RX ORDER — TIZANIDINE 2 MG/1
2 TABLET ORAL EVERY 6 HOURS PRN
Status: DISCONTINUED | OUTPATIENT
Start: 2018-06-21 | End: 2018-06-22

## 2018-06-21 RX ORDER — SCOLOPAMINE TRANSDERMAL SYSTEM 1 MG/1
1 PATCH, EXTENDED RELEASE TRANSDERMAL ONCE
Status: COMPLETED | OUTPATIENT
Start: 2018-06-21 | End: 2018-06-21

## 2018-06-21 RX ORDER — SODIUM CHLORIDE, SODIUM LACTATE, POTASSIUM CHLORIDE, CALCIUM CHLORIDE 600; 310; 30; 20 MG/100ML; MG/100ML; MG/100ML; MG/100ML
INJECTION, SOLUTION INTRAVENOUS CONTINUOUS
Status: DISCONTINUED | OUTPATIENT
Start: 2018-06-21 | End: 2018-06-22

## 2018-06-21 RX ORDER — HYDROMORPHONE HYDROCHLORIDE 1 MG/ML
0.8 INJECTION, SOLUTION INTRAMUSCULAR; INTRAVENOUS; SUBCUTANEOUS EVERY 2 HOUR PRN
Status: DISCONTINUED | OUTPATIENT
Start: 2018-06-21 | End: 2018-06-22

## 2018-06-21 RX ORDER — SODIUM CHLORIDE 9 MG/ML
INJECTION, SOLUTION INTRAVENOUS CONTINUOUS
Status: DISCONTINUED | OUTPATIENT
Start: 2018-06-21 | End: 2018-06-22

## 2018-06-21 RX ORDER — HYDROCODONE BITARTRATE AND ACETAMINOPHEN 5; 325 MG/1; MG/1
2 TABLET ORAL AS NEEDED
Status: DISCONTINUED | OUTPATIENT
Start: 2018-06-21 | End: 2018-06-21 | Stop reason: HOSPADM

## 2018-06-21 RX ORDER — DIPHENHYDRAMINE HCL 25 MG
25 CAPSULE ORAL EVERY 4 HOURS PRN
Status: DISCONTINUED | OUTPATIENT
Start: 2018-06-21 | End: 2018-06-22

## 2018-06-21 RX ORDER — HYDROMORPHONE HYDROCHLORIDE 1 MG/ML
0.4 INJECTION, SOLUTION INTRAMUSCULAR; INTRAVENOUS; SUBCUTANEOUS EVERY 2 HOUR PRN
Status: DISCONTINUED | OUTPATIENT
Start: 2018-06-21 | End: 2018-06-22

## 2018-06-21 RX ORDER — SENNOSIDES 8.6 MG
17.2 TABLET ORAL NIGHTLY
Status: DISCONTINUED | OUTPATIENT
Start: 2018-06-21 | End: 2018-06-22

## 2018-06-21 RX ORDER — NALOXONE HYDROCHLORIDE 0.4 MG/ML
80 INJECTION, SOLUTION INTRAMUSCULAR; INTRAVENOUS; SUBCUTANEOUS AS NEEDED
Status: DISCONTINUED | OUTPATIENT
Start: 2018-06-21 | End: 2018-06-21 | Stop reason: HOSPADM

## 2018-06-21 RX ORDER — HYDROCODONE BITARTRATE AND ACETAMINOPHEN 5; 325 MG/1; MG/1
2 TABLET ORAL EVERY 4 HOURS PRN
Status: DISCONTINUED | OUTPATIENT
Start: 2018-06-21 | End: 2018-06-22

## 2018-06-21 RX ORDER — ONDANSETRON 2 MG/ML
4 INJECTION INTRAMUSCULAR; INTRAVENOUS EVERY 4 HOURS PRN
Status: DISCONTINUED | OUTPATIENT
Start: 2018-06-21 | End: 2018-06-22

## 2018-06-21 RX ORDER — HYDROMORPHONE HYDROCHLORIDE 1 MG/ML
0.5 INJECTION, SOLUTION INTRAMUSCULAR; INTRAVENOUS; SUBCUTANEOUS EVERY 5 MIN PRN
Status: DISCONTINUED | OUTPATIENT
Start: 2018-06-21 | End: 2018-06-21 | Stop reason: HOSPADM

## 2018-06-21 RX ORDER — HYDROMORPHONE HYDROCHLORIDE 1 MG/ML
INJECTION, SOLUTION INTRAMUSCULAR; INTRAVENOUS; SUBCUTANEOUS
Status: COMPLETED
Start: 2018-06-21 | End: 2018-06-21

## 2018-06-21 RX ORDER — DIPHENHYDRAMINE HYDROCHLORIDE 50 MG/ML
25 INJECTION INTRAMUSCULAR; INTRAVENOUS EVERY 4 HOURS PRN
Status: DISCONTINUED | OUTPATIENT
Start: 2018-06-21 | End: 2018-06-22

## 2018-06-21 RX ORDER — ONDANSETRON 2 MG/ML
4 INJECTION INTRAMUSCULAR; INTRAVENOUS AS NEEDED
Status: DISCONTINUED | OUTPATIENT
Start: 2018-06-21 | End: 2018-06-21 | Stop reason: HOSPADM

## 2018-06-21 RX ORDER — SCOLOPAMINE TRANSDERMAL SYSTEM 1 MG/1
PATCH, EXTENDED RELEASE TRANSDERMAL
Status: COMPLETED
Start: 2018-06-21 | End: 2018-06-21

## 2018-06-21 RX ORDER — DEXAMETHASONE SODIUM PHOSPHATE 4 MG/ML
4 VIAL (ML) INJECTION EVERY 6 HOURS
Status: COMPLETED | OUTPATIENT
Start: 2018-06-21 | End: 2018-06-22

## 2018-06-21 RX ORDER — MIDAZOLAM HYDROCHLORIDE 1 MG/ML
INJECTION INTRAMUSCULAR; INTRAVENOUS
Status: COMPLETED
Start: 2018-06-21 | End: 2018-06-21

## 2018-06-21 RX ORDER — DIAZEPAM 5 MG/1
5 TABLET ORAL EVERY 8 HOURS PRN
Status: DISCONTINUED | OUTPATIENT
Start: 2018-06-21 | End: 2018-06-22

## 2018-06-21 RX ORDER — DOCUSATE SODIUM 100 MG/1
100 CAPSULE, LIQUID FILLED ORAL 2 TIMES DAILY
Status: DISCONTINUED | OUTPATIENT
Start: 2018-06-21 | End: 2018-06-22

## 2018-06-21 RX ORDER — MIDAZOLAM HYDROCHLORIDE 1 MG/ML
1 INJECTION INTRAMUSCULAR; INTRAVENOUS EVERY 5 MIN PRN
Status: DISCONTINUED | OUTPATIENT
Start: 2018-06-21 | End: 2018-06-21 | Stop reason: HOSPADM

## 2018-06-21 RX ORDER — HYDROCODONE BITARTRATE AND ACETAMINOPHEN 5; 325 MG/1; MG/1
1 TABLET ORAL EVERY 4 HOURS PRN
Status: DISCONTINUED | OUTPATIENT
Start: 2018-06-21 | End: 2018-06-22

## 2018-06-21 RX ORDER — CEFAZOLIN SODIUM/WATER 2 G/20 ML
SYRINGE (ML) INTRAVENOUS
Status: DISPENSED
Start: 2018-06-21 | End: 2018-06-21

## 2018-06-21 NOTE — INTERVAL H&P NOTE
Pre-op Diagnosis: Myelopathy ([facility] [G95.9]    The above referenced H&P was reviewed by Jessika Murcia MD on 6/21/2018, the patient was examined and no significant changes have occurred in the patient's condition since the H&P was performed.   I discussed wit

## 2018-06-21 NOTE — ANESTHESIA PREPROCEDURE EVALUATION
PRE-OP EVALUATION    Patient Name: Izzy Macnilla    Pre-op Diagnosis: [facility]el[facility] ([facility] [G95.9]    Procedure(s):  CERVICAL 3 -CERVICAL 4, CERVICAL 6 -CERVICAL 7 ANTERIOR DISCECTOMY AND INSTRUMENTED FUSION    Surgeon(s) and Role:     Christian Fang, RBC 4.83 05/23/2018   HGB 16.1 (H) 05/23/2018   HCT 47.1 05/23/2018   MCV 97.5 05/23/2018   MCH 33.3 (H) 05/23/2018   MCHC 34.2 05/23/2018   RDW 13.4 05/23/2018   .0 05/23/2018       Lab Results  Component Value Date    05/23/2018   K 4.2 05

## 2018-06-21 NOTE — ANESTHESIA POSTPROCEDURE EVALUATION
[address] Patient Status:  Outpatient in a Bed   Age/Gender 49year old female MRN [id number]   [facility] SURGERY Attending Carmelo Lacey MD   Hosp Day # 0 PCP [facility]TY [facility]       Anesthesia Post-op Note    Proced

## 2018-06-21 NOTE — BRIEF OP NOTE
Pre-Operative Diagnosis: [facility]el[facility] ([facility] [G95.9]     Post-Operative Diagnosis: Myelopathy ([facility] [G95.9]      Procedure Performed:   Procedure(s):  CERVICAL 3 -CERVICAL 4, CERVICAL 6 -CERVICAL 7 ANTERIOR DISCECTOMY AND INSTRUMENTED FUSION    Surgeon(s) and

## 2018-06-21 NOTE — OPERATIVE REPORT
[address] [facility], [address] [place] [address] REPORT    Patient:  Danielle De La Torre;  YOB: 1970     CSN:  [id number]; Medical Record Number:  [id number]    Admission Date:  6/21/2018 Operation Date:  6/21/2018    . ..........................     Operating Ph muscle and carotid sheath and lateral to respiratory apparatus and esophagus down to the deep midline prevertebral fascia.  The prevertebral fascia was incised sharply and prevertebral soft tissue was bluntly dissected to expose the anterior cervical spine at a very low setting of 5, by very carefully lifting them off the dura of the root with a right angled bipolar forceps. The surgical assistant provided continuous irrigation during cautery.   The dura of the central canal and exiting nerve roots was nicely variable screws at each vertebral body level to secure the instrumentation flush to the anterior vertebral bodies. Once an acceptable position on imaging and by visual inspection was accomplished the closure was initiated.     The closure included generous i

## 2018-06-21 NOTE — H&P (VIEW-ONLY)
[facility]  Neurosurgery Consult    [address] Patient Status:  Inpatient    7/15/1970 MRN [id number]   [facility] [place] 7NE-A Attending Celestino Paul [address] Day # 1 PCP ELIZABETH GARCÍA     REASON FOR CONSULTATION:  Cervica MethylPREDNISolone Sodium Succ (Solu-MEDROL) 500 mg in sodium chloride 0.9 % 100 mL IVPB 500 mg Intravenous QAM   Enoxaparin Sodium (LOVENOX) 60 MG/0.6ML injection 50 mg 0.5 mg/kg Subcutaneous Daily   nicotine (NICODERM CQ) 14 MG/24HR 1 patch 1 patch Tra enhancement. MRI C/T Spine -     Multiple high T2 signal lesions intrinsic to the cervical spinal cord and upper thoracic spinal cord at the C2 through C4 level and C6-7 level as well as the T1 -2 level posteriorly and T8-9 level.   There is some cord en to both her demyelinating process and the evidence of injury to her spinal cord. I emphasized the fact that we cannot predict if and when she will deteriorate or at what rate, but that she is at risk for loss of function.  She has a risk of devastating outc

## 2018-06-22 ENCOUNTER — APPOINTMENT (OUTPATIENT)
Dept: GENERAL RADIOLOGY | Facility: HOSPITAL | Age: 48
End: 2018-06-22
Attending: NEUROLOGICAL SURGERY
Payer: COMMERCIAL

## 2018-06-22 VITALS
HEART RATE: 79 BPM | SYSTOLIC BLOOD PRESSURE: 119 MMHG | RESPIRATION RATE: 20 BRPM | BODY MASS INDEX: 39.52 KG/M2 | TEMPERATURE: 98 F | DIASTOLIC BLOOD PRESSURE: 62 MMHG | HEIGHT: 64 IN | WEIGHT: 231.5 LBS | OXYGEN SATURATION: 96 %

## 2018-06-22 PROCEDURE — 72040 X-RAY EXAM NECK SPINE 2-3 VW: CPT | Performed by: NEUROLOGICAL SURGERY

## 2018-06-22 PROCEDURE — 99242 OFF/OP CONSLTJ NEW/EST SF 20: CPT | Performed by: HOSPITALIST

## 2018-06-22 RX ORDER — HYDROCODONE BITARTRATE AND ACETAMINOPHEN 5; 325 MG/1; MG/1
1 TABLET ORAL EVERY 6 HOURS PRN
Qty: 30 TABLET | Refills: 0 | Status: SHIPPED | OUTPATIENT
Start: 2018-06-22 | End: 2018-07-06

## 2018-06-22 RX ORDER — TIZANIDINE 2 MG/1
2 TABLET ORAL EVERY 6 HOURS PRN
Qty: 40 TABLET | Refills: 0 | Status: SHIPPED | OUTPATIENT
Start: 2018-06-22 | End: 2018-07-06

## 2018-06-22 NOTE — PAYOR COMM NOTE
--------------  ADMISSION REVIEW       6/21    DIRECT FOR OR            []Manual[]Template  []Copied  Pre-Operative Diagnosis: Myelopathy ([facility] [G95.9]     Post-Operative Diagnosis: Myelopathy ([facility] [G95.9]      Procedure Performed:   Procedure(s):  NMIIHC

## 2018-06-22 NOTE — PHYSICAL THERAPY NOTE
PHYSICAL THERAPY QUICK EVALUATION - INPATIENT    Room Number: 378/378-A  Evaluation Date: 6/22/2018  Presenting Problem: S/p C3-C4, C6-C7 ACDF on 06/21/18  Physician Order: PT Eval and Treat    Problem List  Active Problems:    * No active hospital probl techniques;Relaxation;Repositioning   RANGE OF MOTION AND STRENGTH ASSESSMENT  Upper extremity ROM and strength are within functional limits     Lower extremity ROM is within functional limits     Lower extremity strength is within functional limits except aware of session/findings; All patient questions and concerns addressed;SCDs in place; Discussed recommendations with /    ASSESSMENT   Patient is a 49year old female admitted on 6/21/2018 for  S/p C3-C4, C6-C7 ACDF on 06/21/18.   Pe

## 2018-06-22 NOTE — PROGRESS NOTES
[facility]  FCO Progress Note    Haydee Mancilla Patient Status:  Inpatient    7/15/1970 MRN [id number]   [facility] 3SW-A Attending Luz Ramsay MD   Hosp Day # 1 PCP [facility]       Subjective: Pain well-controlled, minimal

## 2018-06-22 NOTE — PLAN OF CARE
NURSING ADMISSION NOTE      Patient admitted via bed  Oriented to room. Safety precautions initiated. Bed in low position. Call light in reach.   Received the patient from PACU via bed alert and awake ,denies any pain ,aspen collar in place ,dressing

## 2018-06-22 NOTE — OCCUPATIONAL THERAPY NOTE
OCCUPATIONAL THERAPY QUICK EVALUATION - INPATIENT    Room Number: 378/378-A  Evaluation Date: 6/22/2018     Type of Evaluation: Quick Eval  Presenting Problem: s/p C3-C4, C6-C7 ACDF 6/21/18    Physician Order: IP Consult to Occupational Therapy  Reason for good!\"    OBJECTIVE  Precautions: Cervical brace;Spine  Fall Risk: High fall risk    WEIGHT BEARING RESTRICTION  Weight Bearing Restriction: None                PAIN ASSESSMENT  Ratin  Location: neck  Management Techniques: Activity promotion; Body mec transfer techniques, performed transfer Mod I to standard height toilet with no use of grab bar required, toileting Mod I; education on collar management, doffing and donning independent; education on incorporation of neck protection principles into safe e services. Please re-order if a new functional limitation presents during this admission.     Patient was able to achieve the following goals:  Patient able to toilet transfer: safely and independently  Patient able to dress lower extremities: safely and in

## 2018-06-22 NOTE — CONSULTS
[address] [facility] Patient Status:  Inpatient    7/15/1970 MRN [id number]   [facility] 3SW-A Attending Marielena Esquivel MD   Hosp Day # 0 PCP [facility]     Reason for consult: Medical evaluation    Reques and negatives noted in the HPI. Physical Exam:    /73   Pulse 66   Temp 98.9 °F (37.2 °C) (Temporal)   Resp 16   Ht 5' 4\" (1.626 m)   Wt 231 lb 7.7 oz (105 kg)   LMP 06/21/2018   SpO2 98%   BMI 39.73 kg/m²   General: No acute distress.  Alert and

## 2018-06-23 NOTE — PLAN OF CARE
Impaired Activities of Daily Living    • Achieve highest/safest level of independence in self care Adequate for Discharge          Post-op xrays taken. Pt cleared for discharge home today.  Pt received one-on-one discharge education from the spine program c

## 2018-07-06 ENCOUNTER — OFFICE VISIT (OUTPATIENT)
Dept: SURGERY | Facility: CLINIC | Age: 48
End: 2018-07-06

## 2018-07-06 VITALS — HEART RATE: 84 BPM | DIASTOLIC BLOOD PRESSURE: 70 MMHG | SYSTOLIC BLOOD PRESSURE: 132 MMHG

## 2018-07-06 DIAGNOSIS — R20.2 PARESTHESIAS: ICD-10-CM

## 2018-07-06 DIAGNOSIS — Z98.1 S/P CERVICAL SPINAL FUSION: ICD-10-CM

## 2018-07-06 DIAGNOSIS — G95.9 MYELOPATHY (HCC): Primary | ICD-10-CM

## 2018-07-06 PROCEDURE — 99024 POSTOP FOLLOW-UP VISIT: CPT | Performed by: PHYSICIAN ASSISTANT

## 2018-07-06 RX ORDER — GABAPENTIN 300 MG/1
CAPSULE ORAL
Qty: 60 CAPSULE | Refills: 0 | Status: SHIPPED | OUTPATIENT
Start: 2018-07-06 | End: 2018-08-13 | Stop reason: ALTCHOICE

## 2018-07-06 NOTE — PROGRESS NOTES
Neurosurgery Clinic Visit  2018    [address] [facility] PCP:  Amol Gómez    7/15/1970 MRN [id number]       CC:  S/P C3-4, C6-7 ACDF 18 Dr. Jessy Brody    HPI:    Brayden Travis is here for 2 week post op appt. She is doing well.   Minimal neck discomfor

## 2018-07-06 NOTE — PATIENT INSTRUCTIONS
Refill policies:    • Allow 2-3 business days for refills; controlled substances may take longer.   • Contact your pharmacy at least 5 days prior to running out of medication and have them send an electronic request or submit request through the “request re entire amount billed. Precertification and Prior Authorizations: If your physician has recommended that you have a procedure or additional testing performed.   [facility]) will contact your insurance carrier to obtain pre-certi

## 2018-08-13 ENCOUNTER — HOSPITAL ENCOUNTER (OUTPATIENT)
Dept: GENERAL RADIOLOGY | Facility: HOSPITAL | Age: 48
Discharge: HOME | End: 2018-08-13
Attending: PHYSICIAN ASSISTANT
Payer: COMMERCIAL

## 2018-08-13 ENCOUNTER — OFFICE VISIT (OUTPATIENT)
Dept: SURGERY | Facility: CLINIC | Age: 48
End: 2018-08-13

## 2018-08-13 VITALS — DIASTOLIC BLOOD PRESSURE: 76 MMHG | SYSTOLIC BLOOD PRESSURE: 130 MMHG | HEART RATE: 84 BPM

## 2018-08-13 DIAGNOSIS — R20.2 PARESTHESIAS: ICD-10-CM

## 2018-08-13 DIAGNOSIS — Z98.1 S/P CERVICAL SPINAL FUSION: ICD-10-CM

## 2018-08-13 DIAGNOSIS — G95.9 MYELOPATHY (HCC): ICD-10-CM

## 2018-08-13 DIAGNOSIS — Z98.1 S/P CERVICAL SPINAL FUSION: Primary | ICD-10-CM

## 2018-08-13 PROCEDURE — 72040 X-RAY EXAM NECK SPINE 2-3 VW: CPT | Performed by: PHYSICIAN ASSISTANT

## 2018-08-13 PROCEDURE — 99024 POSTOP FOLLOW-UP VISIT: CPT | Performed by: NEUROLOGICAL SURGERY

## 2018-08-13 NOTE — PROGRESS NOTES
[facility]  Neurosurgery Clinic Visit      HISTORY OF PRESENT ILLNESS:  Mercedes Joe is a(n) 51year old female s/p 6/21/18 C3-4, 6-7 ACDF. She has no neck pain. Her right leg continues to feel stronger.  Her medial forearm pare is midline.      Motor: 5/5 x 4   Sensation: intact to light touch bilaterally     Reflexes: deferred   Coordination: deferred   Gait: grossly normal  Incision well-healed, cervical collar removed       DIAGNOSTIC DATA:      IMAGING:  Cervical x-rays with s

## 2018-08-13 NOTE — PATIENT INSTRUCTIONS
Refill policies:    • Allow 2-3 business days for refills; controlled substances may take longer.   • Contact your pharmacy at least 5 days prior to running out of medication and have them send an electronic request or submit request through the “request re entire amount billed. Precertification and Prior Authorizations: If your physician has recommended that you have a procedure or additional testing performed.   [facility]) will contact your insurance carrier to obtain pre-certi

## 2018-08-13 NOTE — PROGRESS NOTES
Pt Is here for follow up for SX surgery for the neck. Pt states that she is doing good since surgery.    SX date 6/21/18

## 2018-09-13 ENCOUNTER — CHARTING TRANS (OUTPATIENT)
Dept: OTHER | Age: 48
End: 2018-09-13

## 2018-09-18 ENCOUNTER — CHARTING TRANS (OUTPATIENT)
Dept: OTHER | Age: 48
End: 2018-09-18

## 2018-09-18 ENCOUNTER — TELEPHONE (OUTPATIENT)
Dept: SURGERY | Facility: CLINIC | Age: 48
End: 2018-09-18

## 2018-09-18 NOTE — TELEPHONE ENCOUNTER
PT Eval dated 09/18 received for provider to review with note stating \"if ok to treat LE please send order w/additional dx\"; endorsed to provider for review/order placement

## 2018-09-20 ENCOUNTER — CHARTING TRANS (OUTPATIENT)
Dept: OTHER | Age: 48
End: 2018-09-20

## 2018-09-21 ENCOUNTER — LAB SERVICES (OUTPATIENT)
Dept: OTHER | Age: 48
End: 2018-09-21

## 2018-09-21 LAB — 25(OH)D3 SERPL-MCNC: 26.7 NG/ML (ref 30–100)

## 2018-09-24 DIAGNOSIS — G95.9 CERVICAL MYELOPATHY (HCC): Primary | ICD-10-CM

## 2018-09-25 ENCOUNTER — CHARTING TRANS (OUTPATIENT)
Dept: OTHER | Age: 48
End: 2018-09-25

## 2018-09-27 ENCOUNTER — CHARTING TRANS (OUTPATIENT)
Dept: OTHER | Age: 48
End: 2018-09-27

## 2018-10-01 ENCOUNTER — MYAURORA ACCOUNT LINK (OUTPATIENT)
Dept: OTHER | Age: 48
End: 2018-10-01

## 2018-10-01 ENCOUNTER — CHARTING TRANS (OUTPATIENT)
Dept: OTHER | Age: 48
End: 2018-10-01

## 2018-10-02 ENCOUNTER — CHARTING TRANS (OUTPATIENT)
Dept: OTHER | Age: 48
End: 2018-10-02

## 2018-10-04 ENCOUNTER — CHARTING TRANS (OUTPATIENT)
Dept: OTHER | Age: 48
End: 2018-10-04

## 2018-10-04 ENCOUNTER — MYAURORA ACCOUNT LINK (OUTPATIENT)
Dept: OTHER | Age: 48
End: 2018-10-04

## 2018-10-09 ENCOUNTER — CHARTING TRANS (OUTPATIENT)
Dept: OTHER | Age: 48
End: 2018-10-09

## 2018-10-11 ENCOUNTER — CHARTING TRANS (OUTPATIENT)
Dept: OTHER | Age: 48
End: 2018-10-11

## 2018-10-15 ENCOUNTER — CHARTING TRANS (OUTPATIENT)
Dept: OTHER | Age: 48
End: 2018-10-15

## 2018-10-17 ENCOUNTER — CHARTING TRANS (OUTPATIENT)
Dept: OTHER | Age: 48
End: 2018-10-17

## 2018-10-22 ENCOUNTER — CHARTING TRANS (OUTPATIENT)
Dept: OTHER | Age: 48
End: 2018-10-22

## 2018-10-24 ENCOUNTER — CHARTING TRANS (OUTPATIENT)
Dept: OTHER | Age: 48
End: 2018-10-24

## 2018-10-24 ENCOUNTER — MYAURORA ACCOUNT LINK (OUTPATIENT)
Dept: OTHER | Age: 48
End: 2018-10-24

## 2018-11-23 ENCOUNTER — IMAGING SERVICES (OUTPATIENT)
Dept: OTHER | Age: 48
End: 2018-11-23

## 2018-11-29 VITALS
HEART RATE: 85 BPM | HEIGHT: 64 IN | WEIGHT: 234 LBS | SYSTOLIC BLOOD PRESSURE: 128 MMHG | TEMPERATURE: 99.5 F | DIASTOLIC BLOOD PRESSURE: 76 MMHG | BODY MASS INDEX: 39.95 KG/M2

## 2018-11-29 VITALS
DIASTOLIC BLOOD PRESSURE: 78 MMHG | SYSTOLIC BLOOD PRESSURE: 130 MMHG | HEART RATE: 97 BPM | RESPIRATION RATE: 12 BRPM | OXYGEN SATURATION: 99 % | TEMPERATURE: 96.6 F

## 2018-12-10 ENCOUNTER — HOSPITAL ENCOUNTER (OUTPATIENT)
Dept: GENERAL RADIOLOGY | Facility: HOSPITAL | Age: 48
Discharge: HOME | End: 2018-12-10
Attending: PHYSICIAN ASSISTANT
Payer: COMMERCIAL

## 2018-12-10 ENCOUNTER — OFFICE VISIT (OUTPATIENT)
Dept: SURGERY | Facility: CLINIC | Age: 48
End: 2018-12-10
Payer: COMMERCIAL

## 2018-12-10 VITALS — HEART RATE: 84 BPM | SYSTOLIC BLOOD PRESSURE: 140 MMHG | DIASTOLIC BLOOD PRESSURE: 80 MMHG

## 2018-12-10 DIAGNOSIS — Z98.1 S/P CERVICAL SPINAL FUSION: ICD-10-CM

## 2018-12-10 DIAGNOSIS — Z98.1 S/P CERVICAL SPINAL FUSION: Primary | ICD-10-CM

## 2018-12-10 PROCEDURE — 72040 X-RAY EXAM NECK SPINE 2-3 VW: CPT | Performed by: PHYSICIAN ASSISTANT

## 2018-12-10 PROCEDURE — 99213 OFFICE O/P EST LOW 20 MIN: CPT | Performed by: PHYSICIAN ASSISTANT

## 2018-12-10 NOTE — PATIENT INSTRUCTIONS
Refill policies:    • Allow 2-3 business days for refills; controlled substances may take longer.   • Contact your pharmacy at least 5 days prior to running out of medication and have them send an electronic request or submit request through the “request re entire amount billed. Precertification and Prior Authorizations: If your physician has recommended that you have a procedure or additional testing performed.   [facility]) will contact your insurance carrier to obtain pre-certi

## 2018-12-10 NOTE — PROGRESS NOTES
Pt is here for follow up PT for the cervical. Pt is no longer in PT. Pt states that she did well with PT. Pt states that PT did help with movement.      Pain scale: 0/10

## 2018-12-10 NOTE — PROGRESS NOTES
Patient: [address] [place] [address] Record Number: [id number]    PCP: Queen Shaggy        HISTORY OF CHIEF COMPLAINT:    Devon Harrison is a 51year old female, who presents 6 months post op from C3-4, C6-7 ACDF.  She was myelopathic pre-operative extremities. ? MEDICAL DECISION MAKING:    Impression: Post-op C3-4, C6-7 ACDF    Plan: Pt is doing very well. She still has some symptoms of myelopathy, but feels she has significantly improved. She understands that some symptoms may never resolve.

## 2019-03-05 VITALS
DIASTOLIC BLOOD PRESSURE: 76 MMHG | SYSTOLIC BLOOD PRESSURE: 124 MMHG | BODY MASS INDEX: 39.78 KG/M2 | HEIGHT: 64 IN | RESPIRATION RATE: 16 BRPM | WEIGHT: 233 LBS

## 2019-03-06 VITALS
TEMPERATURE: 97.7 F | OXYGEN SATURATION: 98 % | WEIGHT: 236 LBS | HEART RATE: 96 BPM | SYSTOLIC BLOOD PRESSURE: 124 MMHG | BODY MASS INDEX: 40.51 KG/M2 | DIASTOLIC BLOOD PRESSURE: 70 MMHG

## 2019-03-06 VITALS
RESPIRATION RATE: 20 BRPM | OXYGEN SATURATION: 97 % | DIASTOLIC BLOOD PRESSURE: 82 MMHG | SYSTOLIC BLOOD PRESSURE: 164 MMHG | TEMPERATURE: 96 F | HEART RATE: 92 BPM

## 2019-06-10 ENCOUNTER — OFFICE VISIT (OUTPATIENT)
Dept: SURGERY | Facility: CLINIC | Age: 49
End: 2019-06-10
Payer: COMMERCIAL

## 2019-06-10 ENCOUNTER — HOSPITAL ENCOUNTER (OUTPATIENT)
Dept: GENERAL RADIOLOGY | Facility: HOSPITAL | Age: 49
Discharge: HOME | End: 2019-06-10
Attending: PHYSICIAN ASSISTANT
Payer: COMMERCIAL

## 2019-06-10 VITALS
HEIGHT: 64 IN | SYSTOLIC BLOOD PRESSURE: 126 MMHG | OXYGEN SATURATION: 99 % | HEART RATE: 80 BPM | BODY MASS INDEX: 40 KG/M2 | DIASTOLIC BLOOD PRESSURE: 80 MMHG

## 2019-06-10 DIAGNOSIS — Z98.1 S/P CERVICAL SPINAL FUSION: Primary | ICD-10-CM

## 2019-06-10 DIAGNOSIS — Z98.1 S/P CERVICAL SPINAL FUSION: ICD-10-CM

## 2019-06-10 PROCEDURE — 72040 X-RAY EXAM NECK SPINE 2-3 VW: CPT | Performed by: PHYSICIAN ASSISTANT

## 2019-06-10 PROCEDURE — 99213 OFFICE O/P EST LOW 20 MIN: CPT | Performed by: NEUROLOGICAL SURGERY

## 2019-06-10 NOTE — PROGRESS NOTES
[facility]  Neurosurgery Clinic Visit      HISTORY OF PRESENT ILLNESS:  Vola Hamman is a(n) 51year old female with a history of MS and cervical myelopathy s/p 6/21/18 C3-4 and C6-7 ACDF.  She reports doing well with very mild n to light. EOMs intact. Face is symmetrical and sensation is intact. Tongue is midline.      Motor: 5/5 x 4   Sensation: intact to light touch bilaterally     Reflexes: 2+, no clonus, no mejia's   Coordination: deferred   Gait: deferred  Incision well-h

## 2019-12-11 ENCOUNTER — OFFICE VISIT (OUTPATIENT)
Dept: SURGERY | Facility: CLINIC | Age: 49
End: 2019-12-11
Payer: COMMERCIAL

## 2019-12-11 VITALS — HEART RATE: 68 BPM | DIASTOLIC BLOOD PRESSURE: 80 MMHG | SYSTOLIC BLOOD PRESSURE: 140 MMHG

## 2019-12-11 DIAGNOSIS — G95.9 MYELOPATHY (HCC): ICD-10-CM

## 2019-12-11 DIAGNOSIS — Z98.1 S/P CERVICAL SPINAL FUSION: Primary | ICD-10-CM

## 2019-12-11 PROCEDURE — 99212 OFFICE O/P EST SF 10 MIN: CPT | Performed by: NEUROLOGICAL SURGERY

## 2019-12-11 NOTE — PROGRESS NOTES
s/p ACDF     Patient is here for follow up. Pt states she is doing well. No new symptoms to be reported.

## 2019-12-11 NOTE — PROGRESS NOTES
Neurosurgery Clinic Visit  2019    [address] [facility] PCP:  Kayla Valenzuela    7/15/1970 MRN [id number]       CC:  S/P C3-4, C6-7 ACDF 18 Dr. Gamble Heads    HPI:    Susan Rider is here for 18 month post op appt. She continues to do very well.   She has mil Allergies     MEDICATIONS:    Prescriptions Prior to Admission      (Not in a hospital admission)        No current facility-administered medications for this visit.      REVIEW OF SYSTEMS:  A 10-point system was reviewed.   Pertinent positives and negative

## 2020-09-16 ENCOUNTER — APPOINTMENT (OUTPATIENT)
Dept: FAMILY MEDICINE | Age: 50
End: 2020-09-16

## 2020-10-01 ENCOUNTER — LAB SERVICES (OUTPATIENT)
Dept: LAB | Age: 50
End: 2020-10-01

## 2020-10-01 ENCOUNTER — TELEPHONE (OUTPATIENT)
Dept: GASTROENTEROLOGY | Age: 50
End: 2020-10-01

## 2020-10-01 ENCOUNTER — OFFICE VISIT (OUTPATIENT)
Dept: FAMILY MEDICINE | Age: 50
End: 2020-10-01

## 2020-10-01 VITALS
BODY MASS INDEX: 38.93 KG/M2 | SYSTOLIC BLOOD PRESSURE: 126 MMHG | WEIGHT: 228 LBS | HEART RATE: 84 BPM | HEIGHT: 64 IN | DIASTOLIC BLOOD PRESSURE: 86 MMHG

## 2020-10-01 DIAGNOSIS — Z12.39 ENCOUNTER FOR SCREENING FOR MALIGNANT NEOPLASM OF BREAST, UNSPECIFIED SCREENING MODALITY: ICD-10-CM

## 2020-10-01 DIAGNOSIS — Z87.898 HISTORY OF FEVER: ICD-10-CM

## 2020-10-01 DIAGNOSIS — Z00.00 PREVENTATIVE HEALTH CARE: Primary | ICD-10-CM

## 2020-10-01 DIAGNOSIS — E66.01 MORBID OBESITY (CMD): ICD-10-CM

## 2020-10-01 DIAGNOSIS — R20.2 TINGLING: ICD-10-CM

## 2020-10-01 DIAGNOSIS — Z00.00 PREVENTATIVE HEALTH CARE: ICD-10-CM

## 2020-10-01 DIAGNOSIS — Z12.11 SCREENING FOR COLON CANCER: ICD-10-CM

## 2020-10-01 DIAGNOSIS — E55.9 VITAMIN D DEFICIENCY: ICD-10-CM

## 2020-10-01 DIAGNOSIS — F17.200 TOBACCO DEPENDENCE: ICD-10-CM

## 2020-10-01 PROBLEM — G35 MULTIPLE SCLEROSIS (CMD): Status: ACTIVE | Noted: 2018-06-15

## 2020-10-01 PROBLEM — Z98.1 S/P CERVICAL SPINAL FUSION: Status: ACTIVE | Noted: 2018-09-18

## 2020-10-01 PROBLEM — F32.81 PMDD (PREMENSTRUAL DYSPHORIC DISORDER): Status: ACTIVE | Noted: 2017-05-26

## 2020-10-01 PROBLEM — M48.02 CERVICAL STENOSIS OF SPINE: Status: ACTIVE | Noted: 2018-06-15

## 2020-10-01 LAB
25(OH)D3 SERPL-MCNC: 27.3 NG/ML (ref 30–100)
ALBUMIN SERPL-MCNC: 4.2 G/DL (ref 3.6–5.1)
ALP SERPL-CCNC: 75 U/L (ref 45–130)
ALT SERPL W/O P-5'-P-CCNC: 52 U/L (ref 4–38)
AST SERPL-CCNC: 40 U/L (ref 14–43)
BASOPHIL %: 0.6 % (ref 0–1.2)
BASOPHIL ABSOLUTE #: 0.1 10*3/UL (ref 0–0.1)
BILIRUB SERPL-MCNC: 0.6 MG/DL (ref 0–1.3)
BUN SERPL-MCNC: 12 MG/DL (ref 7–20)
CALCIUM SERPL-MCNC: 9.6 MG/DL (ref 8.6–10.6)
CHLORIDE SERPL-SCNC: 105 MMOL/L (ref 96–107)
CHOLEST SERPL-MCNC: 206 MG/DL (ref 140–200)
CO2 SERPL-SCNC: 29 MMOL/L (ref 22–32)
CREAT SERPL-MCNC: 0.9 MG/DL (ref 0.5–1.4)
DIFFERENTIAL TYPE: ABNORMAL
EOSINOPHIL %: 2.4 % (ref 0–10)
EOSINOPHIL ABSOLUTE #: 0.2 10*3/UL (ref 0–0.5)
EST. AVERAGE GLUCOSE BLD GHB EST-MCNC: 115 MG/DL (ref 0–154)
GFR SERPL CREATININE-BSD FRML MDRD: >60 ML/MIN/{1.73M2}
GFR SERPL CREATININE-BSD FRML MDRD: >60 ML/MIN/{1.73M2}
GLUCOSE P FAST SERPL-MCNC: 97 MG/DL (ref 60–100)
HBA1C MFR BLD: 5.7 % (ref 4.2–6)
HDLC SERPL-MCNC: 50 MG/DL
HEMATOCRIT: 47.6 % (ref 34–45)
HEMOGLOBIN: 16 G/DL (ref 11.2–15.7)
IMMATURE GRANULOCYTE ABSOLUTE: 0.02 10*3/UL (ref 0–0.05)
IMMATURE GRANULOCYTE PERCENT: 0.2 % (ref 0–0.5)
LDLC SERPL CALC-MCNC: 127 MG/DL (ref 30–100)
LYMPH PERCENT: 27.1 % (ref 20.5–51.1)
LYMPHOCYTE ABSOLUTE #: 2.6 10*3/UL (ref 1.2–3.4)
MEAN CORPUSCULAR HGB CONCENTRATION: 33.6 % (ref 32–36)
MEAN CORPUSCULAR HGB: 32.7 PG (ref 27–34)
MEAN CORPUSCULAR VOLUME: 97.3 FL (ref 79–95)
MEAN PLATELET VOLUME: 11.9 FL (ref 8.6–12.4)
MONOCYTE ABSOLUTE #: 0.7 10*3/UL (ref 0.2–0.9)
MONOCYTE PERCENT: 7.5 % (ref 4.3–12.9)
NEUTROPHIL ABSOLUTE #: 6.1 10*3/UL (ref 1.4–6.5)
NEUTROPHIL PERCENT: 62.2 % (ref 34–73.5)
PLATELET COUNT: 197 10*3/UL (ref 150–400)
POTASSIUM SERPL-SCNC: 5.3 MMOL/L (ref 3.5–5.3)
PROT SERPL-MCNC: 7.6 G/DL (ref 6.4–8.5)
RED BLOOD CELL COUNT: 4.89 10*6/UL (ref 3.7–5.2)
RED CELL DISTRIBUTION WIDTH: 13.3 % (ref 11.3–14.8)
SODIUM SERPL-SCNC: 137 MMOL/L (ref 136–146)
TRIGL SERPL-MCNC: 146 MG/DL (ref 0–200)
TSH SERPL DL<=0.05 MIU/L-ACNC: 1.26 M[IU]/L (ref 0.3–4.82)
VIT B12 SERPL-MCNC: 375 PG/ML (ref 193–982)
WHITE BLOOD CELL COUNT: 9.7 10*3/UL (ref 4–10)

## 2020-10-01 PROCEDURE — 82306 VITAMIN D 25 HYDROXY: CPT | Performed by: FAMILY MEDICINE

## 2020-10-01 PROCEDURE — 99396 PREV VISIT EST AGE 40-64: CPT | Performed by: FAMILY MEDICINE

## 2020-10-01 PROCEDURE — 86769 SARS-COV-2 COVID-19 ANTIBODY: CPT | Performed by: FAMILY MEDICINE

## 2020-10-01 PROCEDURE — 82607 VITAMIN B-12: CPT | Performed by: FAMILY MEDICINE

## 2020-10-01 PROCEDURE — 36415 COLL VENOUS BLD VENIPUNCTURE: CPT | Performed by: FAMILY MEDICINE

## 2020-10-01 PROCEDURE — 80050 GENERAL HEALTH PANEL: CPT | Performed by: FAMILY MEDICINE

## 2020-10-01 PROCEDURE — 83036 HEMOGLOBIN GLYCOSYLATED A1C: CPT | Performed by: FAMILY MEDICINE

## 2020-10-01 PROCEDURE — 80061 LIPID PANEL: CPT | Performed by: FAMILY MEDICINE

## 2020-10-01 SDOH — HEALTH STABILITY: PHYSICAL HEALTH: ON AVERAGE, HOW MANY MINUTES DO YOU ENGAGE IN EXERCISE AT THIS LEVEL?: 0 MIN

## 2020-10-01 SDOH — HEALTH STABILITY: MENTAL HEALTH
STRESS IS WHEN SOMEONE FEELS TENSE, NERVOUS, ANXIOUS, OR CAN'T SLEEP AT NIGHT BECAUSE THEIR MIND IS TROUBLED. HOW STRESSED ARE YOU?: TO SOME EXTENT

## 2020-10-01 SDOH — HEALTH STABILITY: MENTAL HEALTH: HOW OFTEN DO YOU HAVE A DRINK CONTAINING ALCOHOL?: MONTHLY OR LESS

## 2020-10-01 SDOH — HEALTH STABILITY: PHYSICAL HEALTH: ON AVERAGE, HOW MANY DAYS PER WEEK DO YOU ENGAGE IN MODERATE TO STRENUOUS EXERCISE (LIKE A BRISK WALK)?: 0 DAYS

## 2020-10-01 ASSESSMENT — PATIENT HEALTH QUESTIONNAIRE - PHQ9
SUM OF ALL RESPONSES TO PHQ9 QUESTIONS 1 AND 2: 0
CLINICAL INTERPRETATION OF PHQ2 SCORE: NO FURTHER SCREENING NEEDED
SUM OF ALL RESPONSES TO PHQ9 QUESTIONS 1 AND 2: 0
2. FEELING DOWN, DEPRESSED OR HOPELESS: NOT AT ALL
1. LITTLE INTEREST OR PLEASURE IN DOING THINGS: NOT AT ALL
CLINICAL INTERPRETATION OF PHQ9 SCORE: NO FURTHER SCREENING NEEDED

## 2020-10-02 LAB
SARS-COV-2 IGG SERPL QL IA: NEGATIVE
SARS-COV-2 N IGG SERPL QL IA: 0.02

## 2020-10-06 DIAGNOSIS — R74.8 ELEVATED LIVER ENZYMES: Primary | ICD-10-CM

## 2020-10-06 DIAGNOSIS — D72.828 OTHER ELEVATED WHITE BLOOD CELL (WBC) COUNT: ICD-10-CM

## 2020-10-08 ENCOUNTER — TELEPHONE (OUTPATIENT)
Dept: GASTROENTEROLOGY | Age: 50
End: 2020-10-08

## 2020-10-29 ENCOUNTER — LAB SERVICES (OUTPATIENT)
Dept: LAB | Age: 50
End: 2020-10-29

## 2020-10-29 DIAGNOSIS — D72.828 OTHER ELEVATED WHITE BLOOD CELL (WBC) COUNT: ICD-10-CM

## 2020-10-29 DIAGNOSIS — R74.8 ELEVATED LIVER ENZYMES: ICD-10-CM

## 2020-10-29 LAB
ALBUMIN SERPL-MCNC: 4.1 G/DL (ref 3.6–5.1)
ALP SERPL-CCNC: 81 U/L (ref 45–130)
ALT SERPL W/O P-5'-P-CCNC: 35 U/L (ref 4–38)
AST SERPL-CCNC: 31 U/L (ref 14–43)
BASOPHIL %: 0.4 % (ref 0–1.2)
BASOPHIL ABSOLUTE #: 0 10*3/UL (ref 0–0.1)
BILIRUB CONJ SERPL-MCNC: 0 MG/DL (ref 0–0.3)
BILIRUB SERPL-MCNC: 0.4 MG/DL (ref 0–1.3)
DIFFERENTIAL TYPE: ABNORMAL
EOSINOPHIL %: 3.5 % (ref 0–10)
EOSINOPHIL ABSOLUTE #: 0.3 10*3/UL (ref 0–0.5)
HEMATOCRIT: 48 % (ref 34–45)
HEMOGLOBIN: 15.8 G/DL (ref 11.2–15.7)
IMMATURE GRANULOCYTE ABSOLUTE: 0.03 10*3/UL (ref 0–0.05)
IMMATURE GRANULOCYTE PERCENT: 0.3 % (ref 0–0.5)
LYMPH PERCENT: 27 % (ref 20.5–51.1)
LYMPHOCYTE ABSOLUTE #: 2.5 10*3/UL (ref 1.2–3.4)
MEAN CORPUSCULAR HGB CONCENTRATION: 32.9 % (ref 32–36)
MEAN CORPUSCULAR HGB: 32.3 PG (ref 27–34)
MEAN CORPUSCULAR VOLUME: 98.2 FL (ref 79–95)
MEAN PLATELET VOLUME: 12.7 FL (ref 8.6–12.4)
MONOCYTE ABSOLUTE #: 1 10*3/UL (ref 0.2–0.9)
MONOCYTE PERCENT: 10.6 % (ref 4.3–12.9)
NEUTROPHIL ABSOLUTE #: 5.5 10*3/UL (ref 1.4–6.5)
NEUTROPHIL PERCENT: 58.2 % (ref 34–73.5)
PLATELET COUNT: 181 10*3/UL (ref 150–400)
PROT SERPL-MCNC: 7.3 G/DL (ref 6.4–8.5)
RED BLOOD CELL COUNT: 4.89 10*6/UL (ref 3.7–5.2)
RED CELL DISTRIBUTION WIDTH: 13.4 % (ref 11.3–14.8)
WHITE BLOOD CELL COUNT: 9.4 10*3/UL (ref 4–10)

## 2020-10-29 PROCEDURE — 36415 COLL VENOUS BLD VENIPUNCTURE: CPT | Performed by: FAMILY MEDICINE

## 2020-10-29 PROCEDURE — 80076 HEPATIC FUNCTION PANEL: CPT | Performed by: FAMILY MEDICINE

## 2020-10-29 PROCEDURE — 85025 COMPLETE CBC W/AUTO DIFF WBC: CPT | Performed by: FAMILY MEDICINE

## 2021-03-13 ENCOUNTER — TELEPHONE (OUTPATIENT)
Dept: FAMILY MEDICINE | Age: 51
End: 2021-03-13

## 2021-05-20 ENCOUNTER — IMAGING SERVICES (OUTPATIENT)
Dept: MAMMOGRAPHY | Age: 51
End: 2021-05-20
Attending: FAMILY MEDICINE

## 2021-05-20 DIAGNOSIS — Z00.00 PREVENTATIVE HEALTH CARE: ICD-10-CM

## 2021-05-20 PROCEDURE — 77067 SCR MAMMO BI INCL CAD: CPT | Performed by: RADIOLOGY

## 2021-05-20 PROCEDURE — 77063 BREAST TOMOSYNTHESIS BI: CPT | Performed by: RADIOLOGY

## 2021-10-01 ENCOUNTER — APPOINTMENT (OUTPATIENT)
Dept: FAMILY MEDICINE | Age: 51
End: 2021-10-01

## 2021-10-27 ENCOUNTER — LAB SERVICES (OUTPATIENT)
Dept: LAB | Age: 51
End: 2021-10-27

## 2021-10-27 ENCOUNTER — OFFICE VISIT (OUTPATIENT)
Dept: FAMILY MEDICINE | Age: 51
End: 2021-10-27

## 2021-10-27 VITALS
WEIGHT: 233 LBS | HEART RATE: 84 BPM | HEIGHT: 64 IN | SYSTOLIC BLOOD PRESSURE: 130 MMHG | DIASTOLIC BLOOD PRESSURE: 72 MMHG | BODY MASS INDEX: 39.78 KG/M2 | TEMPERATURE: 97.6 F

## 2021-10-27 DIAGNOSIS — E66.01 MORBID OBESITY (CMD): ICD-10-CM

## 2021-10-27 DIAGNOSIS — Z00.00 PREVENTATIVE HEALTH CARE: ICD-10-CM

## 2021-10-27 DIAGNOSIS — E55.9 VITAMIN D DEFICIENCY: ICD-10-CM

## 2021-10-27 DIAGNOSIS — F17.200 TOBACCO DEPENDENCE: ICD-10-CM

## 2021-10-27 DIAGNOSIS — Z12.4 SCREENING FOR CERVICAL CANCER: ICD-10-CM

## 2021-10-27 DIAGNOSIS — Z12.11 SCREEN FOR COLON CANCER: ICD-10-CM

## 2021-10-27 DIAGNOSIS — Z00.00 PREVENTATIVE HEALTH CARE: Primary | ICD-10-CM

## 2021-10-27 DIAGNOSIS — Z11.51 SCREENING FOR HPV (HUMAN PAPILLOMAVIRUS): ICD-10-CM

## 2021-10-27 LAB
25(OH)D3 SERPL-MCNC: 17.8 NG/ML (ref 30–100)
ALBUMIN SERPL-MCNC: 4.1 G/DL (ref 3.6–5.1)
ALP SERPL-CCNC: 70 U/L (ref 45–130)
ALT SERPL W/O P-5'-P-CCNC: 28 U/L (ref 4–38)
AST SERPL-CCNC: 28 U/L (ref 14–43)
BASOPHIL %: 0.4 % (ref 0–1.2)
BASOPHIL ABSOLUTE #: 0 10*3/UL (ref 0–0.1)
BILIRUB SERPL-MCNC: 0.6 MG/DL (ref 0–1.3)
BUN SERPL-MCNC: 9 MG/DL (ref 7–20)
CALCIUM SERPL-MCNC: 9.6 MG/DL (ref 8.6–10.6)
CHLORIDE SERPL-SCNC: 104 MMOL/L (ref 96–107)
CHOLEST SERPL-MCNC: 193 MG/DL (ref 140–200)
CO2 SERPL-SCNC: 27 MMOL/L (ref 22–32)
CREAT SERPL-MCNC: 0.8 MG/DL (ref 0.5–1.4)
DIFFERENTIAL TYPE: ABNORMAL
EOSINOPHIL %: 3 % (ref 0–10)
EOSINOPHIL ABSOLUTE #: 0.3 10*3/UL (ref 0–0.5)
EST. AVERAGE GLUCOSE BLD GHB EST-MCNC: 121 MG/DL (ref 0–154)
GFR SERPL CREATININE-BSD FRML MDRD: >60 ML/MIN/{1.73M2}
GFR SERPL CREATININE-BSD FRML MDRD: >60 ML/MIN/{1.73M2}
GLUCOSE P FAST SERPL-MCNC: 101 MG/DL (ref 60–100)
HBA1C MFR BLD: 5.9 % (ref 4.2–6)
HDLC SERPL-MCNC: 49 MG/DL
HEMATOCRIT: 45.7 % (ref 34–45)
HEMOGLOBIN: 15.2 G/DL (ref 11.2–15.7)
IMMATURE GRANULOCYTE ABSOLUTE: 0.03 10*3/UL (ref 0–0.05)
IMMATURE GRANULOCYTE PERCENT: 0.3 % (ref 0–0.5)
LDLC SERPL CALC-MCNC: 117 MG/DL (ref 30–100)
LYMPH PERCENT: 22.2 % (ref 20.5–51.1)
LYMPHOCYTE ABSOLUTE #: 2.3 10*3/UL (ref 1.2–3.4)
MEAN CORPUSCULAR HGB CONCENTRATION: 33.3 % (ref 32–36)
MEAN CORPUSCULAR HGB: 32.5 PG (ref 27–34)
MEAN CORPUSCULAR VOLUME: 97.6 FL (ref 79–95)
MEAN PLATELET VOLUME: 11.6 FL (ref 8.6–12.4)
MONOCYTE ABSOLUTE #: 0.9 10*3/UL (ref 0.2–0.9)
MONOCYTE PERCENT: 8.7 % (ref 4.3–12.9)
NEUTROPHIL ABSOLUTE #: 6.7 10*3/UL (ref 1.4–6.5)
NEUTROPHIL PERCENT: 65.4 % (ref 34–73.5)
PLATELET COUNT: 183 10*3/UL (ref 150–400)
POTASSIUM SERPL-SCNC: 4.7 MMOL/L (ref 3.5–5.3)
PROT SERPL-MCNC: 7.1 G/DL (ref 6.4–8.5)
RED BLOOD CELL COUNT: 4.68 10*6/UL (ref 3.7–5.2)
RED CELL DISTRIBUTION WIDTH: 13.6 % (ref 11.3–14.8)
SODIUM SERPL-SCNC: 139 MMOL/L (ref 136–146)
TRIGL SERPL-MCNC: 136 MG/DL (ref 0–200)
TSH SERPL DL<=0.05 MIU/L-ACNC: 1.22 M[IU]/L (ref 0.3–4.82)
WHITE BLOOD CELL COUNT: 10.3 10*3/UL (ref 4–10)

## 2021-10-27 PROCEDURE — 88142 CYTOPATH C/V THIN LAYER: CPT | Performed by: PATHOLOGY

## 2021-10-27 PROCEDURE — 99396 PREV VISIT EST AGE 40-64: CPT | Performed by: FAMILY MEDICINE

## 2021-10-27 PROCEDURE — 36415 COLL VENOUS BLD VENIPUNCTURE: CPT | Performed by: FAMILY MEDICINE

## 2021-10-27 PROCEDURE — 87624 HPV HI-RISK TYP POOLED RSLT: CPT | Performed by: CLINICAL MEDICAL LABORATORY

## 2021-10-27 PROCEDURE — 88141 CYTOPATH C/V INTERPRET: CPT | Performed by: PATHOLOGY

## 2021-10-27 PROCEDURE — 83036 HEMOGLOBIN GLYCOSYLATED A1C: CPT | Performed by: FAMILY MEDICINE

## 2021-10-27 PROCEDURE — 80050 GENERAL HEALTH PANEL: CPT | Performed by: FAMILY MEDICINE

## 2021-10-27 PROCEDURE — 80061 LIPID PANEL: CPT | Performed by: FAMILY MEDICINE

## 2021-10-27 PROCEDURE — 82306 VITAMIN D 25 HYDROXY: CPT | Performed by: FAMILY MEDICINE

## 2021-10-27 ASSESSMENT — PATIENT HEALTH QUESTIONNAIRE - PHQ9
2. FEELING DOWN, DEPRESSED OR HOPELESS: NOT AT ALL
SUM OF ALL RESPONSES TO PHQ9 QUESTIONS 1 AND 2: 0
1. LITTLE INTEREST OR PLEASURE IN DOING THINGS: NOT AT ALL
CLINICAL INTERPRETATION OF PHQ2 SCORE: NO FURTHER SCREENING NEEDED
CLINICAL INTERPRETATION OF PHQ9 SCORE: NO FURTHER SCREENING NEEDED
SUM OF ALL RESPONSES TO PHQ9 QUESTIONS 1 AND 2: 0
SUM OF ALL RESPONSES TO PHQ9 QUESTIONS 1 AND 2: 0

## 2021-10-28 RX ORDER — ERGOCALCIFEROL 1.25 MG/1
1.25 CAPSULE ORAL
Qty: 12 CAPSULE | Refills: 0 | Status: SHIPPED | OUTPATIENT
Start: 2021-11-01 | End: 2023-01-06 | Stop reason: ALTCHOICE

## 2021-11-02 LAB — AP REPORT: NORMAL

## 2021-11-03 ENCOUNTER — LAB REQUISITION (OUTPATIENT)
Dept: LAB | Age: 51
End: 2021-11-03

## 2021-11-03 DIAGNOSIS — Z11.51 ENCOUNTER FOR SCREENING FOR HUMAN PAPILLOMAVIRUS (HPV): ICD-10-CM

## 2021-11-03 DIAGNOSIS — Z12.4 ENCOUNTER FOR SCREENING FOR MALIGNANT NEOPLASM OF CERVIX: ICD-10-CM

## 2021-11-03 DIAGNOSIS — E55.9 VITAMIN D DEFICIENCY, UNSPECIFIED: ICD-10-CM

## 2021-11-03 DIAGNOSIS — Z00.00 ENCOUNTER FOR GENERAL ADULT MEDICAL EXAMINATION WITHOUT ABNORMAL FINDINGS: ICD-10-CM

## 2021-11-03 DIAGNOSIS — Z12.11 ENCOUNTER FOR SCREENING FOR MALIGNANT NEOPLASM OF COLON: ICD-10-CM

## 2021-11-03 DIAGNOSIS — F17.200 NICOTINE DEPENDENCE, UNSPECIFIED, UNCOMPLICATED: ICD-10-CM

## 2021-11-03 DIAGNOSIS — E66.01 MORBID (SEVERE) OBESITY DUE TO EXCESS CALORIES (CMD): ICD-10-CM

## 2021-11-04 LAB
HPV16+18+45 E6+E7MRNA CVX NAA+PROBE: NEGATIVE
HPV16+18+45 E6+E7MRNA CVX NAA+PROBE: NEGATIVE
Lab: NORMAL
Lab: NORMAL

## 2021-11-05 RX ORDER — METRONIDAZOLE 500 MG/1
500 TABLET ORAL 2 TIMES DAILY
Qty: 14 TABLET | Refills: 0 | Status: SHIPPED | OUTPATIENT
Start: 2021-11-05 | End: 2022-10-28 | Stop reason: ALTCHOICE

## 2021-12-06 ENCOUNTER — TELEPHONE (OUTPATIENT)
Dept: GASTROENTEROLOGY | Age: 51
End: 2021-12-06

## 2022-10-28 ENCOUNTER — LAB SERVICES (OUTPATIENT)
Dept: LAB | Age: 52
End: 2022-10-28

## 2022-10-28 ENCOUNTER — OFFICE VISIT (OUTPATIENT)
Dept: FAMILY MEDICINE | Age: 52
End: 2022-10-28

## 2022-10-28 VITALS
SYSTOLIC BLOOD PRESSURE: 136 MMHG | HEIGHT: 64 IN | HEART RATE: 92 BPM | WEIGHT: 228 LBS | BODY MASS INDEX: 38.93 KG/M2 | DIASTOLIC BLOOD PRESSURE: 76 MMHG

## 2022-10-28 DIAGNOSIS — Z12.39 ENCOUNTER FOR SCREENING FOR MALIGNANT NEOPLASM OF BREAST, UNSPECIFIED SCREENING MODALITY: ICD-10-CM

## 2022-10-28 DIAGNOSIS — E55.9 VITAMIN D DEFICIENCY: ICD-10-CM

## 2022-10-28 DIAGNOSIS — Z00.00 PREVENTATIVE HEALTH CARE: Primary | ICD-10-CM

## 2022-10-28 DIAGNOSIS — E66.09 CLASS 2 OBESITY DUE TO EXCESS CALORIES WITHOUT SERIOUS COMORBIDITY WITH BODY MASS INDEX (BMI) OF 39.0 TO 39.9 IN ADULT: ICD-10-CM

## 2022-10-28 DIAGNOSIS — G35 MULTIPLE SCLEROSIS (CMD): ICD-10-CM

## 2022-10-28 DIAGNOSIS — F17.200 TOBACCO DEPENDENCE: ICD-10-CM

## 2022-10-28 DIAGNOSIS — Z00.00 PREVENTATIVE HEALTH CARE: ICD-10-CM

## 2022-10-28 DIAGNOSIS — E04.1 THYROID NODULE: ICD-10-CM

## 2022-10-28 DIAGNOSIS — G47.30 SLEEP APNEA, UNSPECIFIED TYPE: ICD-10-CM

## 2022-10-28 LAB
25(OH)D3 SERPL-MCNC: 27.4 NG/ML (ref 30–100)
ALBUMIN SERPL-MCNC: 4 G/DL (ref 3.6–5.1)
ALP SERPL-CCNC: 77 U/L (ref 45–130)
ALT SERPL W/O P-5'-P-CCNC: 27 U/L (ref 4–38)
AST SERPL-CCNC: 22 U/L (ref 14–43)
BASOPHIL %: 0.4 % (ref 0–1.2)
BASOPHIL ABSOLUTE #: 0 10*3/UL (ref 0–0.1)
BILIRUB SERPL-MCNC: 0.6 MG/DL (ref 0–1.3)
BUN SERPL-MCNC: 12 MG/DL (ref 7–20)
CALCIUM SERPL-MCNC: 9.5 MG/DL (ref 8.6–10.6)
CHLORIDE SERPL-SCNC: 107 MMOL/L (ref 96–107)
CHOLEST SERPL-MCNC: 168 MG/DL (ref 140–200)
CO2 SERPL-SCNC: 28 MMOL/L (ref 22–32)
CREAT SERPL-MCNC: 0.8 MG/DL (ref 0.5–1.4)
DIFFERENTIAL TYPE: ABNORMAL
EOSINOPHIL %: 2.9 % (ref 0–10)
EOSINOPHIL ABSOLUTE #: 0.3 10*3/UL (ref 0–0.5)
GFR SERPL CREATININE-BSD FRML MDRD: >60 ML/MIN/{1.73M2}
GFR SERPL CREATININE-BSD FRML MDRD: >60 ML/MIN/{1.73M2}
GLUCOSE P FAST SERPL-MCNC: 101 MG/DL (ref 60–100)
HDLC SERPL-MCNC: 46 MG/DL
HEMATOCRIT: 46 % (ref 34–45)
HEMOGLOBIN: 15.9 G/DL (ref 11.2–15.7)
IMMATURE GRANULOCYTE ABSOLUTE: 0.04 10*3/UL (ref 0–0.05)
IMMATURE GRANULOCYTE PERCENT: 0.4 % (ref 0–0.5)
LDLC SERPL CALC-MCNC: 105 MG/DL (ref 30–100)
LYMPH PERCENT: 21.6 % (ref 20.5–51.1)
LYMPHOCYTE ABSOLUTE #: 2.4 10*3/UL (ref 1.2–3.4)
MEAN CORPUSCULAR HGB CONCENTRATION: 34.6 % (ref 32–36)
MEAN CORPUSCULAR HGB: 33.3 PG (ref 27–34)
MEAN CORPUSCULAR VOLUME: 96.2 FL (ref 79–95)
MEAN PLATELET VOLUME: 12.1 FL (ref 8.6–12.4)
MONOCYTE ABSOLUTE #: 0.9 10*3/UL (ref 0.2–0.9)
MONOCYTE PERCENT: 8.4 % (ref 4.3–12.9)
NEUTROPHIL ABSOLUTE #: 7.4 10*3/UL (ref 1.4–6.5)
NEUTROPHIL PERCENT: 66.3 % (ref 34–73.5)
PLATELET COUNT: 200 10*3/UL (ref 150–400)
POTASSIUM SERPL-SCNC: 5.1 MMOL/L (ref 3.5–5.3)
PROT SERPL-MCNC: 7.2 G/DL (ref 6.4–8.5)
RED BLOOD CELL COUNT: 4.78 10*6/UL (ref 3.7–5.2)
RED CELL DISTRIBUTION WIDTH: 13.2 % (ref 11.3–14.8)
SODIUM SERPL-SCNC: 139 MMOL/L (ref 136–146)
TRIGL SERPL-MCNC: 87 MG/DL (ref 0–200)
TSH SERPL DL<=0.05 MIU/L-ACNC: 1.02 M[IU]/L (ref 0.3–4.82)
WHITE BLOOD CELL COUNT: 11.1 10*3/UL (ref 4–10)

## 2022-10-28 PROCEDURE — 80061 LIPID PANEL: CPT | Performed by: FAMILY MEDICINE

## 2022-10-28 PROCEDURE — 36415 COLL VENOUS BLD VENIPUNCTURE: CPT | Performed by: FAMILY MEDICINE

## 2022-10-28 PROCEDURE — 99396 PREV VISIT EST AGE 40-64: CPT | Performed by: FAMILY MEDICINE

## 2022-10-28 PROCEDURE — 82306 VITAMIN D 25 HYDROXY: CPT | Performed by: FAMILY MEDICINE

## 2022-10-28 PROCEDURE — 80050 GENERAL HEALTH PANEL: CPT | Performed by: FAMILY MEDICINE

## 2022-10-28 ASSESSMENT — PATIENT HEALTH QUESTIONNAIRE - PHQ9
SUM OF ALL RESPONSES TO PHQ9 QUESTIONS 1 AND 2: 0
CLINICAL INTERPRETATION OF PHQ2 SCORE: NO FURTHER SCREENING NEEDED
SUM OF ALL RESPONSES TO PHQ9 QUESTIONS 1 AND 2: 0
2. FEELING DOWN, DEPRESSED OR HOPELESS: NOT AT ALL
1. LITTLE INTEREST OR PLEASURE IN DOING THINGS: NOT AT ALL

## 2022-11-01 PROBLEM — R73.01 IFG (IMPAIRED FASTING GLUCOSE): Status: ACTIVE | Noted: 2022-11-01

## 2023-01-06 ENCOUNTER — OFFICE VISIT (OUTPATIENT)
Dept: SLEEP MEDICINE | Age: 53
End: 2023-01-06

## 2023-01-06 VITALS
BODY MASS INDEX: 38.58 KG/M2 | SYSTOLIC BLOOD PRESSURE: 148 MMHG | DIASTOLIC BLOOD PRESSURE: 74 MMHG | WEIGHT: 226 LBS | OXYGEN SATURATION: 98 % | HEART RATE: 89 BPM | TEMPERATURE: 97.7 F | HEIGHT: 64 IN

## 2023-01-06 DIAGNOSIS — G47.10 HYPERSOMNIA, UNSPECIFIED: Primary | ICD-10-CM

## 2023-01-06 DIAGNOSIS — Z91.89 RISK FACTORS FOR OBSTRUCTIVE SLEEP APNEA: ICD-10-CM

## 2023-01-06 DIAGNOSIS — R06.83 SNORING: ICD-10-CM

## 2023-01-06 PROCEDURE — 99204 OFFICE O/P NEW MOD 45 MIN: CPT | Performed by: NURSE PRACTITIONER

## 2023-01-06 PROCEDURE — 99211 OFF/OP EST MAY X REQ PHY/QHP: CPT | Performed by: NURSE PRACTITIONER

## 2023-01-06 ASSESSMENT — ENCOUNTER SYMPTOMS
EXCESSIVE DAYTIME SLEEPINESS: 1
PARESTHESIAS: 0
CHILLS: 0
WEIGHT LOSS: 0
SNORING: 1
PHOTOPHOBIA: 0
FEVER: 0
BACK PAIN: 0
NUMBNESS: 0
CONSTIPATION: 0
BLURRED VISION: 0
SORE THROAT: 0
SLEEP DISTURBANCES DUE TO BREATHING: 0
DIARRHEA: 0

## 2023-01-17 ENCOUNTER — IMAGING SERVICES (OUTPATIENT)
Dept: ULTRASOUND IMAGING | Age: 53
End: 2023-01-17
Attending: FAMILY MEDICINE

## 2023-01-17 ENCOUNTER — IMAGING SERVICES (OUTPATIENT)
Dept: MAMMOGRAPHY | Age: 53
End: 2023-01-17
Attending: FAMILY MEDICINE

## 2023-01-17 DIAGNOSIS — Z12.39 ENCOUNTER FOR SCREENING FOR MALIGNANT NEOPLASM OF BREAST, UNSPECIFIED SCREENING MODALITY: ICD-10-CM

## 2023-01-17 DIAGNOSIS — E04.1 THYROID NODULE: ICD-10-CM

## 2023-01-17 PROCEDURE — 77067 SCR MAMMO BI INCL CAD: CPT | Performed by: RADIOLOGY

## 2023-01-17 PROCEDURE — 76536 US EXAM OF HEAD AND NECK: CPT | Performed by: RADIOLOGY

## 2023-01-17 PROCEDURE — 77063 BREAST TOMOSYNTHESIS BI: CPT | Performed by: RADIOLOGY

## 2023-02-15 ENCOUNTER — OFFICE VISIT (OUTPATIENT)
Dept: GASTROENTEROLOGY | Age: 53
End: 2023-02-15

## 2023-02-15 VITALS — HEIGHT: 64 IN | BODY MASS INDEX: 39.61 KG/M2 | WEIGHT: 232 LBS

## 2023-02-15 DIAGNOSIS — M43.22 CERVICAL VERTEBRAL FUSION: ICD-10-CM

## 2023-02-15 DIAGNOSIS — G35 MULTIPLE SCLEROSIS (CMD): ICD-10-CM

## 2023-02-15 DIAGNOSIS — E66.9 OBESITY, UNSPECIFIED CLASSIFICATION, UNSPECIFIED OBESITY TYPE, UNSPECIFIED WHETHER SERIOUS COMORBIDITY PRESENT: ICD-10-CM

## 2023-02-15 DIAGNOSIS — Z12.11 SPECIAL SCREENING FOR MALIGNANT NEOPLASM OF COLON: Primary | ICD-10-CM

## 2023-02-15 PROCEDURE — 99204 OFFICE O/P NEW MOD 45 MIN: CPT | Performed by: INTERNAL MEDICINE

## 2023-02-15 RX ORDER — SIMETHICONE 125 MG
TABLET,CHEWABLE ORAL
Qty: 2 TABLET | Refills: 0 | Status: SHIPPED | OUTPATIENT
Start: 2023-02-15 | End: 2023-04-01

## 2023-02-15 RX ORDER — BISACODYL 5 MG/1
TABLET, DELAYED RELEASE ORAL
Qty: 2 TABLET | Refills: 0 | Status: SHIPPED | OUTPATIENT
Start: 2023-02-15 | End: 2023-04-01

## 2023-02-20 ENCOUNTER — ANESTHESIA EVENT (OUTPATIENT)
Dept: GASTROENTEROLOGY | Age: 53
End: 2023-02-20

## 2023-02-20 RX ORDER — HUMAN INSULIN 100 [IU]/ML
INJECTION, SOLUTION SUBCUTANEOUS
Status: CANCELLED | OUTPATIENT
Start: 2023-02-20

## 2023-02-20 RX ORDER — SODIUM CHLORIDE 9 MG/ML
INJECTION, SOLUTION INTRAVENOUS CONTINUOUS
Status: CANCELLED | OUTPATIENT
Start: 2023-02-20

## 2023-02-20 RX ORDER — NICOTINE POLACRILEX 4 MG
30 LOZENGE BUCCAL
Status: CANCELLED | OUTPATIENT
Start: 2023-02-20

## 2023-02-20 RX ORDER — DEXTROSE MONOHYDRATE 50 MG/ML
INJECTION, SOLUTION INTRAVENOUS CONTINUOUS PRN
Status: CANCELLED | OUTPATIENT
Start: 2023-02-20

## 2023-02-20 RX ORDER — SODIUM CHLORIDE, SODIUM LACTATE, POTASSIUM CHLORIDE, CALCIUM CHLORIDE 600; 310; 30; 20 MG/100ML; MG/100ML; MG/100ML; MG/100ML
INJECTION, SOLUTION INTRAVENOUS CONTINUOUS
Status: CANCELLED | OUTPATIENT
Start: 2023-02-20

## 2023-02-20 RX ORDER — 0.9 % SODIUM CHLORIDE 0.9 %
2 VIAL (ML) INJECTION EVERY 12 HOURS SCHEDULED
Status: CANCELLED | OUTPATIENT
Start: 2023-02-20

## 2023-02-20 RX ORDER — DEXTROSE MONOHYDRATE 25 G/50ML
25 INJECTION, SOLUTION INTRAVENOUS PRN
Status: CANCELLED | OUTPATIENT
Start: 2023-02-20

## 2023-02-21 ENCOUNTER — ANESTHESIA (OUTPATIENT)
Dept: GASTROENTEROLOGY | Age: 53
End: 2023-02-21

## 2023-02-21 ENCOUNTER — APPOINTMENT (OUTPATIENT)
Dept: GASTROENTEROLOGY | Age: 53
End: 2023-02-21
Attending: INTERNAL MEDICINE

## 2023-02-21 ENCOUNTER — TELEPHONE (OUTPATIENT)
Dept: GASTROENTEROLOGY | Age: 53
End: 2023-02-21

## 2023-02-21 DIAGNOSIS — Z12.11 SPECIAL SCREENING FOR MALIGNANT NEOPLASM OF COLON: Primary | ICD-10-CM

## 2023-02-27 ENCOUNTER — TELEPHONE (OUTPATIENT)
Dept: SLEEP MEDICINE | Age: 53
End: 2023-02-27

## 2023-03-01 ENCOUNTER — APPOINTMENT (OUTPATIENT)
Dept: SLEEP MEDICINE | Age: 53
End: 2023-03-01

## 2023-03-01 ENCOUNTER — ANESTHESIA EVENT (OUTPATIENT)
Dept: GASTROENTEROLOGY | Age: 53
End: 2023-03-01

## 2023-03-01 ENCOUNTER — HOSPITAL ENCOUNTER (OUTPATIENT)
Dept: GASTROENTEROLOGY | Age: 53
Discharge: HOME | End: 2023-03-01
Attending: INTERNAL MEDICINE

## 2023-03-01 ENCOUNTER — ANESTHESIA (OUTPATIENT)
Dept: GASTROENTEROLOGY | Age: 53
End: 2023-03-01

## 2023-03-01 VITALS
WEIGHT: 219 LBS | TEMPERATURE: 97.6 F | RESPIRATION RATE: 18 BRPM | SYSTOLIC BLOOD PRESSURE: 141 MMHG | DIASTOLIC BLOOD PRESSURE: 63 MMHG | BODY MASS INDEX: 37.39 KG/M2 | HEART RATE: 62 BPM | HEIGHT: 64 IN | OXYGEN SATURATION: 99 %

## 2023-03-01 DIAGNOSIS — Z12.11 SPECIAL SCREENING FOR MALIGNANT NEOPLASM OF COLON: ICD-10-CM

## 2023-03-01 DIAGNOSIS — K63.5 POLYP OF COLON, UNSPECIFIED PART OF COLON, UNSPECIFIED TYPE: ICD-10-CM

## 2023-03-01 DIAGNOSIS — K64.9 HEMORRHOIDS, UNSPECIFIED HEMORRHOID TYPE: ICD-10-CM

## 2023-03-01 LAB
B-HCG UR QL: NEGATIVE
INTERNAL PROCEDURAL CONTROLS ACCEPTABLE: YES
TEST LOT EXPIRATION DATE: NORMAL
TEST LOT NUMBER: NORMAL

## 2023-03-01 PROCEDURE — 45385 COLONOSCOPY W/LESION REMOVAL: CPT | Performed by: CLINIC/CENTER

## 2023-03-01 PROCEDURE — 45381 COLONOSCOPY SUBMUCOUS NJX: CPT | Performed by: CLINIC/CENTER

## 2023-03-01 PROCEDURE — 45380 COLONOSCOPY AND BIOPSY: CPT | Performed by: CLINIC/CENTER

## 2023-03-01 PROCEDURE — 45385 COLONOSCOPY W/LESION REMOVAL: CPT | Performed by: INTERNAL MEDICINE

## 2023-03-01 PROCEDURE — 45380 COLONOSCOPY AND BIOPSY: CPT | Performed by: INTERNAL MEDICINE

## 2023-03-01 PROCEDURE — 45381 COLONOSCOPY SUBMUCOUS NJX: CPT | Performed by: INTERNAL MEDICINE

## 2023-03-01 PROCEDURE — 88305 TISSUE EXAM BY PATHOLOGIST: CPT | Performed by: PATHOLOGY

## 2023-03-01 RX ORDER — SODIUM CHLORIDE 9 MG/ML
INJECTION, SOLUTION INTRAVENOUS CONTINUOUS
Status: DISCONTINUED | OUTPATIENT
Start: 2023-03-01 | End: 2023-03-03 | Stop reason: HOSPADM

## 2023-03-01 RX ORDER — SCOLOPAMINE TRANSDERMAL SYSTEM 1 MG/1
1 PATCH, EXTENDED RELEASE TRANSDERMAL ONCE
Status: DISCONTINUED | OUTPATIENT
Start: 2023-03-01 | End: 2023-03-03 | Stop reason: HOSPADM

## 2023-03-01 RX ORDER — DEXTROSE MONOHYDRATE 50 MG/ML
INJECTION, SOLUTION INTRAVENOUS CONTINUOUS PRN
Status: DISCONTINUED | OUTPATIENT
Start: 2023-03-01 | End: 2023-03-03 | Stop reason: HOSPADM

## 2023-03-01 RX ORDER — ONDANSETRON 2 MG/ML
4 INJECTION INTRAMUSCULAR; INTRAVENOUS EVERY 12 HOURS PRN
Status: DISCONTINUED | OUTPATIENT
Start: 2023-03-01 | End: 2023-03-03 | Stop reason: HOSPADM

## 2023-03-01 RX ORDER — PROPOFOL 10 MG/ML
INJECTION, EMULSION INTRAVENOUS PRN
Status: DISCONTINUED | OUTPATIENT
Start: 2023-03-01 | End: 2023-03-01

## 2023-03-01 RX ORDER — LIDOCAINE HYDROCHLORIDE 10 MG/ML
INJECTION, SOLUTION INFILTRATION; PERINEURAL PRN
Status: DISCONTINUED | OUTPATIENT
Start: 2023-03-01 | End: 2023-03-01

## 2023-03-01 RX ORDER — SODIUM CHLORIDE, SODIUM LACTATE, POTASSIUM CHLORIDE, CALCIUM CHLORIDE 600; 310; 30; 20 MG/100ML; MG/100ML; MG/100ML; MG/100ML
INJECTION, SOLUTION INTRAVENOUS CONTINUOUS
Status: DISCONTINUED | OUTPATIENT
Start: 2023-03-01 | End: 2023-03-03 | Stop reason: HOSPADM

## 2023-03-01 RX ORDER — 0.9 % SODIUM CHLORIDE 0.9 %
2 VIAL (ML) INJECTION EVERY 12 HOURS SCHEDULED
Status: DISCONTINUED | OUTPATIENT
Start: 2023-03-01 | End: 2023-03-03 | Stop reason: HOSPADM

## 2023-03-01 RX ADMIN — LIDOCAINE HYDROCHLORIDE 50 MG: 10 INJECTION, SOLUTION INFILTRATION; PERINEURAL at 10:48

## 2023-03-01 RX ADMIN — PROPOFOL 30 MG: 10 INJECTION, EMULSION INTRAVENOUS at 10:56

## 2023-03-01 RX ADMIN — PROPOFOL 30 MG: 10 INJECTION, EMULSION INTRAVENOUS at 11:07

## 2023-03-01 RX ADMIN — SODIUM CHLORIDE, SODIUM LACTATE, POTASSIUM CHLORIDE, CALCIUM CHLORIDE: 600; 310; 30; 20 INJECTION, SOLUTION INTRAVENOUS at 10:31

## 2023-03-01 RX ADMIN — ONDANSETRON 4 MG: 2 INJECTION INTRAMUSCULAR; INTRAVENOUS at 10:32

## 2023-03-01 RX ADMIN — PROPOFOL 100 MG: 10 INJECTION, EMULSION INTRAVENOUS at 10:48

## 2023-03-01 RX ADMIN — PROPOFOL 30 MG: 10 INJECTION, EMULSION INTRAVENOUS at 10:58

## 2023-03-01 RX ADMIN — SCOLOPAMINE TRANSDERMAL SYSTEM 1 PATCH: 1 PATCH, EXTENDED RELEASE TRANSDERMAL at 10:34

## 2023-03-01 RX ADMIN — PROPOFOL 30 MG: 10 INJECTION, EMULSION INTRAVENOUS at 10:54

## 2023-03-01 RX ADMIN — PROPOFOL 30 MG: 10 INJECTION, EMULSION INTRAVENOUS at 10:52

## 2023-03-01 RX ADMIN — PROPOFOL 30 MG: 10 INJECTION, EMULSION INTRAVENOUS at 11:04

## 2023-03-01 RX ADMIN — PROPOFOL 30 MG: 10 INJECTION, EMULSION INTRAVENOUS at 11:10

## 2023-03-01 RX ADMIN — PROPOFOL 30 MG: 10 INJECTION, EMULSION INTRAVENOUS at 11:01

## 2023-03-01 RX ADMIN — PROPOFOL 30 MG: 10 INJECTION, EMULSION INTRAVENOUS at 10:50

## 2023-03-01 SDOH — SOCIAL STABILITY: SOCIAL INSECURITY: RISK FACTORS: BMI> 30 (OBESITY)

## 2023-03-01 ASSESSMENT — PAIN SCALES - GENERAL: PAINLEVEL_OUTOF10: 0

## 2023-03-01 ASSESSMENT — LIFESTYLE VARIABLES
SMOKING_YEARS: 37
PACK_YEARS: 37

## 2023-03-01 ASSESSMENT — ENCOUNTER SYMPTOMS: EXERCISE TOLERANCE: GOOD (>4 METS)

## 2023-03-03 ENCOUNTER — CLINICAL DOCUMENTATION (OUTPATIENT)
Dept: GASTROENTEROLOGY | Age: 53
End: 2023-03-03

## 2023-03-03 LAB
ASR DISCLAIMER: NORMAL
CASE RPRT: NORMAL
CLINICAL INFO: NORMAL
PATH REPORT.FINAL DX SPEC: NORMAL
PATH REPORT.GROSS SPEC: NORMAL

## 2023-03-07 LAB — COLONOSCOPY STUDY: NORMAL

## 2023-03-08 ENCOUNTER — OFFICE VISIT (OUTPATIENT)
Dept: SLEEP MEDICINE | Age: 53
End: 2023-03-08
Attending: NURSE PRACTITIONER

## 2023-03-08 DIAGNOSIS — G47.10 HYPERSOMNIA, UNSPECIFIED: ICD-10-CM

## 2023-03-08 DIAGNOSIS — G47.33 OSA (OBSTRUCTIVE SLEEP APNEA): Primary | ICD-10-CM

## 2023-03-08 DIAGNOSIS — R06.83 SNORING: ICD-10-CM

## 2023-03-08 DIAGNOSIS — Z91.89 RISK FACTORS FOR OBSTRUCTIVE SLEEP APNEA: ICD-10-CM

## 2023-03-08 PROCEDURE — 95806 SLEEP STUDY UNATT&RESP EFFT: CPT | Performed by: PEDIATRICS

## 2023-03-09 ENCOUNTER — APPOINTMENT (OUTPATIENT)
Dept: SLEEP MEDICINE | Age: 53
End: 2023-03-09

## 2023-03-09 LAB — REPORT TEXT: NORMAL

## 2023-03-22 ENCOUNTER — V-VISIT (OUTPATIENT)
Dept: SLEEP MEDICINE | Age: 53
End: 2023-03-22

## 2023-03-22 DIAGNOSIS — Z71.2 ENCOUNTER TO DISCUSS TEST RESULTS: ICD-10-CM

## 2023-03-22 DIAGNOSIS — Z91.89 AT RISK FOR OBSTRUCTIVE SLEEP APNEA: Primary | ICD-10-CM

## 2023-03-22 DIAGNOSIS — R06.83 SNORING: ICD-10-CM

## 2023-03-22 PROCEDURE — 99212 OFFICE O/P EST SF 10 MIN: CPT | Performed by: NURSE PRACTITIONER

## 2023-03-22 ASSESSMENT — ENCOUNTER SYMPTOMS
PHOTOPHOBIA: 0
FEVER: 0
EXCESSIVE DAYTIME SLEEPINESS: 0
SNORING: 1
HEADACHES: 0
ALTERED MENTAL STATUS: 0
DIFFICULTY WITH CONCENTRATION: 0
WEIGHT LOSS: 0
SLEEP DISTURBANCES DUE TO BREATHING: 1
VOMITING: 0
SORE THROAT: 0
CHILLS: 0

## 2023-03-22 ASSESSMENT — SLEEP AND FATIGUE QUESTIONNAIRES
HOW LIKELY ARE YOU TO NOD OFF OR FALL ASLEEP WHILE SITTING AND TALKING TO SOMEONE: WOULD NEVER DOZE
HOW LIKELY ARE YOU TO NOD OFF OR FALL ASLEEP WHILE WATCHING TV: SLIGHT CHANCE OF DOZING
HOW LIKELY ARE YOU TO NOD OFF OR FALL ASLEEP WHILE LYING DOWN TO REST IN THE AFTERNOON WHEN CIRCUMSTANCES PERMIT: SLIGHT CHANCE OF DOZING
HOW LIKELY ARE YOU TO NOD OFF OR FALL ASLEEP IN A CAR, WHILE STOPPED FOR A FEW MINUTES IN TRAFFIC: WOULD NEVER DOZE
HOW LIKELY ARE YOU TO NOD OFF OR FALL ASLEEP WHILE SITTING AND READING: SLIGHT CHANCE OF DOZING
HOW LIKELY ARE YOU TO NOD OFF OR FALL ASLEEP WHILE SITTING QUIETLY AFTER LUNCH WITHOUT ALCOHOL: WOULD NEVER DOZE
HOW LIKELY ARE YOU TO NOD OFF OR FALL ASLEEP WHEN YOU ARE A PASSENGER IN A CAR FOR AN HOUR WITHOUT A BREAK: WOULD NEVER DOZE
ESS TOTAL SCORE: 3
HOW LIKELY ARE YOU TO NOD OFF OR FALL ASLEEP WHILE SITTING INACTIVE IN A PUBLIC PLACE: WOULD NEVER DOZE

## 2023-03-24 ENCOUNTER — OFFICE VISIT (OUTPATIENT)
Dept: DERMATOLOGY | Age: 53
End: 2023-03-24

## 2023-03-24 DIAGNOSIS — L71.9 ROSACEA: Primary | ICD-10-CM

## 2023-03-24 PROCEDURE — 99203 OFFICE O/P NEW LOW 30 MIN: CPT | Performed by: DERMATOLOGY

## 2023-03-24 RX ORDER — AZELAIC ACID 0.15 G/G
GEL TOPICAL 2 TIMES DAILY
Qty: 50 G | Refills: 3 | Status: SHIPPED | OUTPATIENT
Start: 2023-03-24

## 2023-11-10 ENCOUNTER — WALK IN (OUTPATIENT)
Dept: URGENT CARE | Age: 53
End: 2023-11-10

## 2023-11-10 VITALS
HEART RATE: 72 BPM | RESPIRATION RATE: 18 BRPM | OXYGEN SATURATION: 97 % | DIASTOLIC BLOOD PRESSURE: 64 MMHG | TEMPERATURE: 98.2 F | SYSTOLIC BLOOD PRESSURE: 134 MMHG

## 2023-11-10 DIAGNOSIS — H69.90 DYSFUNCTION OF EUSTACHIAN TUBE, UNSPECIFIED LATERALITY: Primary | ICD-10-CM

## 2023-11-10 PROCEDURE — 99204 OFFICE O/P NEW MOD 45 MIN: CPT | Performed by: INTERNAL MEDICINE

## 2023-11-10 RX ORDER — AMOXICILLIN AND CLAVULANATE POTASSIUM 875; 125 MG/1; MG/1
1 TABLET, FILM COATED ORAL 2 TIMES DAILY
Qty: 20 TABLET | Refills: 0 | Status: SHIPPED | OUTPATIENT
Start: 2023-11-10 | End: 2023-11-20

## 2024-11-15 ENCOUNTER — TELEPHONE (OUTPATIENT)
Dept: OBGYN | Age: 54
End: 2024-11-15

## 2024-11-15 ENCOUNTER — TELEPHONE (OUTPATIENT)
Dept: GASTROENTEROLOGY | Age: 54
End: 2024-11-15

## 2024-11-15 DIAGNOSIS — Z86.0100 HISTORY OF COLONIC POLYPS: Primary | ICD-10-CM

## 2024-11-15 SDOH — ECONOMIC STABILITY: HOUSING INSECURITY: DO YOU HAVE PROBLEMS WITH ANY OF THE FOLLOWING?: NONE OF THE ABOVE

## 2024-11-15 SDOH — ECONOMIC STABILITY: FOOD INSECURITY: WITHIN THE PAST 12 MONTHS, THE FOOD YOU BOUGHT JUST DIDN'T LAST AND YOU DIDN'T HAVE MONEY TO GET MORE.: NEVER TRUE

## 2024-11-15 SDOH — ECONOMIC STABILITY: HOUSING INSECURITY: WHAT IS YOUR LIVING SITUATION TODAY?: I HAVE A STEADY PLACE TO LIVE

## 2024-11-15 SDOH — ECONOMIC STABILITY: TRANSPORTATION INSECURITY
IN THE PAST 12 MONTHS, HAS LACK OF RELIABLE TRANSPORTATION KEPT YOU FROM MEDICAL APPOINTMENTS, MEETINGS, WORK OR FROM GETTING THINGS NEEDED FOR DAILY LIVING?: NO

## 2024-11-15 SDOH — ECONOMIC STABILITY: GENERAL: WOULD YOU LIKE HELP WITH ANY OF THE FOLLOWING NEEDS?: I DON'T WANT HELP WITH ANY OF THESE

## 2024-11-15 ASSESSMENT — SOCIAL DETERMINANTS OF HEALTH (SDOH): IN THE PAST 12 MONTHS, HAS THE ELECTRIC, GAS, OIL, OR WATER COMPANY THREATENED TO SHUT OFF SERVICE IN YOUR HOME?: NO

## 2024-11-18 ENCOUNTER — APPOINTMENT (OUTPATIENT)
Dept: OBGYN | Age: 54
End: 2024-11-18

## 2024-11-18 VITALS
SYSTOLIC BLOOD PRESSURE: 134 MMHG | BODY MASS INDEX: 40.68 KG/M2 | WEIGHT: 238.25 LBS | HEIGHT: 64 IN | HEART RATE: 73 BPM | DIASTOLIC BLOOD PRESSURE: 100 MMHG | OXYGEN SATURATION: 97 % | TEMPERATURE: 96.8 F | RESPIRATION RATE: 14 BRPM

## 2024-11-18 DIAGNOSIS — Z12.31 ENCOUNTER FOR SCREENING MAMMOGRAM FOR MALIGNANT NEOPLASM OF BREAST: ICD-10-CM

## 2024-11-18 DIAGNOSIS — N95.0 PMB (POSTMENOPAUSAL BLEEDING): Primary | ICD-10-CM

## 2024-11-18 PROBLEM — E55.9 VITAMIN D DEFICIENCY: Status: ACTIVE | Noted: 2018-06-15

## 2024-11-18 PROBLEM — I10 ESSENTIAL HYPERTENSION: Status: ACTIVE | Noted: 2024-11-18

## 2024-11-18 PROCEDURE — 99202 OFFICE O/P NEW SF 15 MIN: CPT | Performed by: NURSE PRACTITIONER

## 2024-11-18 PROCEDURE — 3080F DIAST BP >= 90 MM HG: CPT | Performed by: NURSE PRACTITIONER

## 2024-11-18 PROCEDURE — 3075F SYST BP GE 130 - 139MM HG: CPT | Performed by: NURSE PRACTITIONER

## 2024-11-18 SDOH — SOCIAL STABILITY: SOCIAL INSECURITY: HOW OFTEN DOES ANYONE, INCLUDING FAMILY AND FRIENDS, THREATEN YOU WITH HARM?: NEVER

## 2024-11-18 SDOH — SOCIAL STABILITY: SOCIAL INSECURITY: HOW OFTEN DOES ANYONE, INCLUDING FAMILY AND FRIENDS, PHYSICALLY HURT YOU?: NEVER

## 2024-11-18 SDOH — SOCIAL STABILITY: SOCIAL INSECURITY: HOW OFTEN DOES ANYONE, INCLUDING FAMILY AND FRIENDS, INSULT OR TALK DOWN TO YOU?: NEVER

## 2024-11-18 SDOH — SOCIAL STABILITY: SOCIAL INSECURITY: HOW OFTEN DOES ANYONE, INCLUDING FAMILY AND FRIENDS, SCREAM OR CURSE AT YOU?: NEVER

## 2024-11-20 RX ORDER — POLYETHYLENE GLYCOL 3350, SODIUM CHLORIDE, SODIUM BICARBONATE, POTASSIUM CHLORIDE 420; 11.2; 5.72; 1.48 G/4L; G/4L; G/4L; G/4L
4000 POWDER, FOR SOLUTION ORAL ONCE
Qty: 4000 ML | Refills: 0 | Status: SHIPPED | OUTPATIENT
Start: 2024-11-20 | End: 2024-11-20

## 2024-11-20 RX ORDER — BISACODYL 5 MG/1
TABLET, DELAYED RELEASE ORAL
Qty: 2 TABLET | Refills: 0 | Status: SHIPPED | OUTPATIENT
Start: 2024-11-20 | End: 2025-01-04

## 2024-11-20 RX ORDER — SIMETHICONE 125 MG
TABLET,CHEWABLE ORAL
Qty: 2 TABLET | Refills: 0 | Status: SHIPPED | OUTPATIENT
Start: 2024-11-20 | End: 2025-01-04

## 2024-12-17 ENCOUNTER — APPOINTMENT (OUTPATIENT)
Dept: OBGYN | Age: 54
End: 2024-12-17

## 2024-12-17 ENCOUNTER — APPOINTMENT (OUTPATIENT)
Dept: ULTRASOUND IMAGING | Age: 54
End: 2024-12-17
Attending: NURSE PRACTITIONER

## 2024-12-17 ENCOUNTER — APPOINTMENT (OUTPATIENT)
Dept: MAMMOGRAPHY | Age: 54
End: 2024-12-17
Attending: NURSE PRACTITIONER

## 2024-12-17 VITALS
SYSTOLIC BLOOD PRESSURE: 144 MMHG | RESPIRATION RATE: 15 BRPM | HEART RATE: 78 BPM | DIASTOLIC BLOOD PRESSURE: 80 MMHG | WEIGHT: 235 LBS | HEIGHT: 64 IN | BODY MASS INDEX: 40.12 KG/M2

## 2024-12-17 DIAGNOSIS — Z12.31 ENCOUNTER FOR SCREENING MAMMOGRAM FOR MALIGNANT NEOPLASM OF BREAST: ICD-10-CM

## 2024-12-17 DIAGNOSIS — N95.0 PMB (POSTMENOPAUSAL BLEEDING): ICD-10-CM

## 2024-12-17 DIAGNOSIS — N95.0 PMB (POSTMENOPAUSAL BLEEDING): Primary | ICD-10-CM

## 2024-12-17 PROCEDURE — 3079F DIAST BP 80-89 MM HG: CPT | Performed by: NURSE PRACTITIONER

## 2024-12-17 PROCEDURE — 76856 US EXAM PELVIC COMPLETE: CPT | Performed by: RADIOLOGY

## 2024-12-17 PROCEDURE — 99213 OFFICE O/P EST LOW 20 MIN: CPT | Performed by: NURSE PRACTITIONER

## 2024-12-17 PROCEDURE — 3077F SYST BP >= 140 MM HG: CPT | Performed by: NURSE PRACTITIONER

## 2024-12-17 PROCEDURE — 77067 SCR MAMMO BI INCL CAD: CPT | Performed by: RADIOLOGY

## 2024-12-17 PROCEDURE — 77063 BREAST TOMOSYNTHESIS BI: CPT | Performed by: RADIOLOGY

## 2024-12-17 PROCEDURE — 76830 TRANSVAGINAL US NON-OB: CPT | Performed by: RADIOLOGY

## 2025-03-03 ENCOUNTER — ANESTHESIA EVENT (OUTPATIENT)
Dept: GASTROENTEROLOGY | Age: 55
End: 2025-03-03

## 2025-03-04 ENCOUNTER — APPOINTMENT (OUTPATIENT)
Dept: GASTROENTEROLOGY | Age: 55
End: 2025-03-04
Attending: INTERNAL MEDICINE

## 2025-03-04 ENCOUNTER — TELEPHONE (OUTPATIENT)
Dept: GASTROENTEROLOGY | Age: 55
End: 2025-03-04

## 2025-03-04 ENCOUNTER — ANESTHESIA (OUTPATIENT)
Dept: GASTROENTEROLOGY | Age: 55
End: 2025-03-04

## 2025-03-04 DIAGNOSIS — Z86.0100 HISTORY OF COLONIC POLYPS: Primary | ICD-10-CM

## 2025-03-14 ENCOUNTER — E-ADVICE (OUTPATIENT)
Dept: GASTROENTEROLOGY | Age: 55
End: 2025-03-14

## 2025-03-14 RX ORDER — SOD SULF/POT CHLORIDE/MAG SULF 1.479 G
12 TABLET ORAL SEE ADMIN INSTRUCTIONS
Qty: 24 TABLET | Refills: 0 | Status: SHIPPED | OUTPATIENT
Start: 2025-03-14

## 2025-03-14 RX ORDER — SIMETHICONE 125 MG
TABLET,CHEWABLE ORAL
Qty: 2 TABLET | Refills: 0 | Status: SHIPPED | OUTPATIENT
Start: 2025-03-14 | End: 2025-04-28

## 2025-03-14 RX ORDER — BISACODYL 5 MG/1
TABLET, DELAYED RELEASE ORAL
Qty: 2 TABLET | Refills: 0 | Status: SHIPPED | OUTPATIENT
Start: 2025-03-14 | End: 2025-04-28

## 2025-04-16 ENCOUNTER — ANESTHESIA EVENT (OUTPATIENT)
Dept: GASTROENTEROLOGY | Age: 55
End: 2025-04-16

## 2025-04-17 ENCOUNTER — APPOINTMENT (OUTPATIENT)
Dept: GASTROENTEROLOGY | Age: 55
End: 2025-04-17
Attending: INTERNAL MEDICINE

## 2025-04-17 ENCOUNTER — ANESTHESIA (OUTPATIENT)
Dept: GASTROENTEROLOGY | Age: 55
End: 2025-04-17

## 2025-04-17 VITALS
HEIGHT: 64 IN | TEMPERATURE: 97.4 F | WEIGHT: 234 LBS | HEART RATE: 67 BPM | SYSTOLIC BLOOD PRESSURE: 166 MMHG | DIASTOLIC BLOOD PRESSURE: 78 MMHG | OXYGEN SATURATION: 99 % | RESPIRATION RATE: 16 BRPM | BODY MASS INDEX: 39.95 KG/M2

## 2025-04-17 DIAGNOSIS — K64.9 HEMORRHOIDS, UNSPECIFIED HEMORRHOID TYPE: ICD-10-CM

## 2025-04-17 DIAGNOSIS — K63.5 POLYP OF COLON, UNSPECIFIED PART OF COLON, UNSPECIFIED TYPE: ICD-10-CM

## 2025-04-17 DIAGNOSIS — Z86.0100 HISTORY OF COLONIC POLYPS: ICD-10-CM

## 2025-04-17 LAB — COLONOSCOPY STUDY: ABNORMAL

## 2025-04-17 RX ORDER — 0.9 % SODIUM CHLORIDE 0.9 %
2 VIAL (ML) INJECTION EVERY 12 HOURS SCHEDULED
Status: DISCONTINUED | OUTPATIENT
Start: 2025-04-17 | End: 2025-04-19 | Stop reason: HOSPADM

## 2025-04-17 RX ORDER — ONDANSETRON 2 MG/ML
4 INJECTION INTRAMUSCULAR; INTRAVENOUS ONCE
Status: COMPLETED | OUTPATIENT
Start: 2025-04-17 | End: 2025-04-17

## 2025-04-17 RX ORDER — DEXTROSE MONOHYDRATE 25 G/50ML
25 INJECTION, SOLUTION INTRAVENOUS PRN
Status: DISCONTINUED | OUTPATIENT
Start: 2025-04-17 | End: 2025-04-19 | Stop reason: HOSPADM

## 2025-04-17 RX ORDER — NICOTINE POLACRILEX 4 MG
30 LOZENGE BUCCAL
Status: DISCONTINUED | OUTPATIENT
Start: 2025-04-17 | End: 2025-04-19 | Stop reason: HOSPADM

## 2025-04-17 RX ORDER — PROPOFOL 10 MG/ML
INJECTION, EMULSION INTRAVENOUS PRN
Status: DISCONTINUED | OUTPATIENT
Start: 2025-04-17 | End: 2025-04-17

## 2025-04-17 RX ORDER — SODIUM CHLORIDE, SODIUM LACTATE, POTASSIUM CHLORIDE, CALCIUM CHLORIDE 600; 310; 30; 20 MG/100ML; MG/100ML; MG/100ML; MG/100ML
INJECTION, SOLUTION INTRAVENOUS CONTINUOUS
Status: DISCONTINUED | OUTPATIENT
Start: 2025-04-17 | End: 2025-04-19 | Stop reason: HOSPADM

## 2025-04-17 RX ORDER — SODIUM CHLORIDE 9 MG/ML
INJECTION, SOLUTION INTRAVENOUS CONTINUOUS
Status: DISCONTINUED | OUTPATIENT
Start: 2025-04-17 | End: 2025-04-19 | Stop reason: HOSPADM

## 2025-04-17 RX ORDER — LIDOCAINE HYDROCHLORIDE 10 MG/ML
5 INJECTION, SOLUTION INFILTRATION; PERINEURAL PRN
Status: DISCONTINUED | OUTPATIENT
Start: 2025-04-17 | End: 2025-04-19 | Stop reason: HOSPADM

## 2025-04-17 RX ORDER — LIDOCAINE HYDROCHLORIDE 10 MG/ML
INJECTION, SOLUTION INFILTRATION; PERINEURAL PRN
Status: DISCONTINUED | OUTPATIENT
Start: 2025-04-17 | End: 2025-04-17

## 2025-04-17 RX ORDER — 0.9 % SODIUM CHLORIDE 0.9 %
10 VIAL (ML) INJECTION PRN
Status: DISCONTINUED | OUTPATIENT
Start: 2025-04-17 | End: 2025-04-19 | Stop reason: HOSPADM

## 2025-04-17 RX ORDER — DEXTROSE MONOHYDRATE 50 MG/ML
INJECTION, SOLUTION INTRAVENOUS CONTINUOUS PRN
Status: DISCONTINUED | OUTPATIENT
Start: 2025-04-17 | End: 2025-04-19 | Stop reason: HOSPADM

## 2025-04-17 RX ADMIN — PROPOFOL 20 MG: 10 INJECTION, EMULSION INTRAVENOUS at 15:31

## 2025-04-17 RX ADMIN — PROPOFOL 20 MG: 10 INJECTION, EMULSION INTRAVENOUS at 15:20

## 2025-04-17 RX ADMIN — PROPOFOL 50 MG: 10 INJECTION, EMULSION INTRAVENOUS at 15:15

## 2025-04-17 RX ADMIN — PROPOFOL 20 MG: 10 INJECTION, EMULSION INTRAVENOUS at 15:23

## 2025-04-17 RX ADMIN — PROPOFOL 20 MG: 10 INJECTION, EMULSION INTRAVENOUS at 15:18

## 2025-04-17 RX ADMIN — PROPOFOL 30 MG: 10 INJECTION, EMULSION INTRAVENOUS at 15:17

## 2025-04-17 RX ADMIN — PROPOFOL 20 MG: 10 INJECTION, EMULSION INTRAVENOUS at 15:25

## 2025-04-17 RX ADMIN — PROPOFOL 20 MG: 10 INJECTION, EMULSION INTRAVENOUS at 15:28

## 2025-04-17 RX ADMIN — PROPOFOL 20 MG: 10 INJECTION, EMULSION INTRAVENOUS at 15:32

## 2025-04-17 RX ADMIN — PROPOFOL 20 MG: 10 INJECTION, EMULSION INTRAVENOUS at 15:24

## 2025-04-17 RX ADMIN — PROPOFOL 20 MG: 10 INJECTION, EMULSION INTRAVENOUS at 15:27

## 2025-04-17 RX ADMIN — PROPOFOL 20 MG: 10 INJECTION, EMULSION INTRAVENOUS at 15:21

## 2025-04-17 RX ADMIN — ONDANSETRON 4 MG: 2 INJECTION INTRAMUSCULAR; INTRAVENOUS at 13:55

## 2025-04-17 RX ADMIN — SODIUM CHLORIDE, SODIUM LACTATE, POTASSIUM CHLORIDE, CALCIUM CHLORIDE: 600; 310; 30; 20 INJECTION, SOLUTION INTRAVENOUS at 13:54

## 2025-04-17 RX ADMIN — PROPOFOL 20 MG: 10 INJECTION, EMULSION INTRAVENOUS at 15:30

## 2025-04-17 RX ADMIN — PROPOFOL 40 MG: 10 INJECTION, EMULSION INTRAVENOUS at 15:16

## 2025-04-17 RX ADMIN — LIDOCAINE HYDROCHLORIDE 50 MG: 10 INJECTION, SOLUTION INFILTRATION; PERINEURAL at 15:15

## 2025-04-17 SDOH — SOCIAL STABILITY: SOCIAL INSECURITY: RISK FACTORS: BMI> 30 (OBESITY)

## 2025-04-17 ASSESSMENT — PAIN SCALES - GENERAL
PAINLEVEL_OUTOF10: 0
PAINLEVEL_OUTOF10: 0

## 2025-04-17 ASSESSMENT — ACTIVITIES OF DAILY LIVING (ADL)
ADL_BEFORE_ADMISSION: INDEPENDENT
ADL_SCORE: 12

## 2025-04-21 ENCOUNTER — CLINICAL DOCUMENTATION (OUTPATIENT)
Dept: GASTROENTEROLOGY | Age: 55
End: 2025-04-21

## 2025-05-27 ENCOUNTER — APPOINTMENT (OUTPATIENT)
Dept: OBGYN | Age: 55
End: 2025-05-27

## 2025-05-27 VITALS
HEIGHT: 64 IN | WEIGHT: 238 LBS | SYSTOLIC BLOOD PRESSURE: 140 MMHG | DIASTOLIC BLOOD PRESSURE: 70 MMHG | BODY MASS INDEX: 40.63 KG/M2

## 2025-05-27 DIAGNOSIS — Z11.51 SCREENING FOR HUMAN PAPILLOMAVIRUS (HPV): ICD-10-CM

## 2025-05-27 DIAGNOSIS — Z12.4 CERVICAL CANCER SCREENING: ICD-10-CM

## 2025-05-27 DIAGNOSIS — Z01.419 ENCOUNTER FOR ROUTINE GYNECOLOGICAL EXAMINATION WITH PAPANICOLAOU SMEAR OF CERVIX: Primary | ICD-10-CM

## 2025-05-27 DIAGNOSIS — R39.15 URINARY URGENCY: ICD-10-CM

## 2025-05-27 DIAGNOSIS — Z12.31 ENCOUNTER FOR SCREENING MAMMOGRAM FOR BREAST CANCER: ICD-10-CM

## 2025-05-27 PROCEDURE — 87624 HPV HI-RISK TYP POOLED RSLT: CPT | Performed by: INTERNAL MEDICINE

## 2025-05-27 PROCEDURE — 88142 CYTOPATH C/V THIN LAYER: CPT | Performed by: INTERNAL MEDICINE

## 2025-05-27 SDOH — SOCIAL STABILITY: SOCIAL INSECURITY: HOW OFTEN DOES ANYONE, INCLUDING FAMILY AND FRIENDS, PHYSICALLY HURT YOU?: NEVER

## 2025-05-27 SDOH — SOCIAL STABILITY: SOCIAL INSECURITY: HOW OFTEN DOES ANYONE, INCLUDING FAMILY AND FRIENDS, SCREAM OR CURSE AT YOU?: NEVER

## 2025-05-27 SDOH — SOCIAL STABILITY: SOCIAL INSECURITY: HOW OFTEN DOES ANYONE, INCLUDING FAMILY AND FRIENDS, INSULT OR TALK DOWN TO YOU?: NEVER

## 2025-05-27 SDOH — SOCIAL STABILITY: SOCIAL INSECURITY: HOW OFTEN DOES ANYONE, INCLUDING FAMILY AND FRIENDS, THREATEN YOU WITH HARM?: NEVER

## 2025-05-29 ENCOUNTER — RESULTS FOLLOW-UP (OUTPATIENT)
Dept: OBGYN | Age: 55
End: 2025-05-29

## 2025-05-29 LAB
CASE RPRT: ABNORMAL
CYTOLOGY CVX/VAG STUDY: ABNORMAL
CYTOLOGY CVX/VAG STUDY: ABNORMAL
GEN CATEG CVX/VAG CYTO-IMP: ABNORMAL
HPV16+18+45 E6+E7MRNA CVX NAA+PROBE: NEGATIVE
PAP EDUCATIONAL NOTE: ABNORMAL
SERVICE CMNT-IMP: NORMAL
STAT OF ADQ CVX/VAG CYTO-IMP: ABNORMAL

## (undated) DEVICE — PREP DURA SM 3M 8635

## (undated) DEVICE — DRAPE TABLE COVER 44X90 TC-10

## (undated) DEVICE — SKIN AFFIX .4ML

## (undated) DEVICE — DRILL BIT: Brand: AVIATOR

## (undated) DEVICE — UNDYED BRAIDED (POLYGLACTIN 910), SYNTHETIC ABSORBABLE SUTURE: Brand: COATED VICRYL

## (undated) DEVICE — GOWN,SIRUS,FABRIC-REINFORCED,X-LARGE: Brand: MEDLINE

## (undated) DEVICE — STERILE POLYISOPRENE POWDER-FREE SURGICAL GLOVES: Brand: PROTEXIS

## (undated) DEVICE — DRAPE,THYROID,SOFT,STERILE: Brand: MEDLINE

## (undated) DEVICE — GLOVE SURG SENSICARE SZ 7-1/2

## (undated) DEVICE — DRAPE C-ARM UNIVERSAL

## (undated) DEVICE — LAMINECTOMY CDS: Brand: MEDLINE INDUSTRIES, INC.

## (undated) DEVICE — DRILL SRG OIL CRTDG MAESTRO

## (undated) DEVICE — #15 STERILE STAINLESS BLADE: Brand: STERILE STAINLESS BLADES

## (undated) DEVICE — SPONGE: SPECIALTY PEANUT XR 100/CS: Brand: MEDICAL ACTION INDUSTRIES

## (undated) DEVICE — CAUTERY BLADE 2IN INS E1455

## (undated) DEVICE — ALCOHOL 70% 4 OZ

## (undated) DEVICE — 3M(TM) TEGADERM(TM) TRANSPARENT FILM DRESSING FRAME STYLE 1628: Brand: 3M™ TEGADERM™

## (undated) DEVICE — 3M™ MEDIPORE™ SOFT CLOTH TAPE, 4 INCH X 10 YARDS, 12 ROLLS/CASE, 2964: Brand: 3M™ MEDIPORE™

## (undated) DEVICE — LIGHT HANDLE

## (undated) DEVICE — PAD SACRAL SPAN AID

## (undated) DEVICE — PREMIUM WET SKIN PREP TRAY: Brand: MEDLINE INDUSTRIES, INC.

## (undated) DEVICE — BLADE ELECTROSURG 4IN INSULATE

## (undated) DEVICE — TRANSPOSAL ULTRAFLEX DUO/QUAD ULTRA CART MANIFOLD

## (undated) DEVICE — VIOLET BRAIDED (POLYGLACTIN 910), SYNTHETIC ABSORBABLE SUTURE: Brand: COATED VICRYL

## (undated) DEVICE — DISTRACTION SCREW: Brand: MAYFIELD®

## (undated) DEVICE — KENDALL SCD EXPRESS SLEEVES, THIGH LENGTH, MEDIUM: Brand: KENDALL SCD

## (undated) DEVICE — AGENT HMST STRL KT MTRX THRMB

## (undated) DEVICE — GAUZE SPONGES,8 PLY: Brand: CURITY

## (undated) DEVICE — SOL  .9 1000ML BTL

## (undated) NOTE — LETTER
06/11/18    Dr. Neil Elizabeth,     I had the pleasure of seeing your patient, Sergio Park, in clinic. She has cervical myelopathy in addition to recently diagnosed MS. I have recommended C3-4, 6-7 anterior cervical decompression and fusion.   Thank you for

## (undated) NOTE — LETTER
2018  RE: [place]     : 7/15/1970    Dear Dr. Rodrick Espinal,    This letter is to inform you that your patient is being scheduled for surgery with Dr. Tye Castaneda on 2018 at [facility]. We have asked the patient to contact your office

## (undated) NOTE — Clinical Note
Johnny Whitley,  I saw Tataina Guardado for 2 week post op appt. Her right leg is much stronger, still 4+ right IP. She will work on her strength at home and defers PT.   Thanks,  Lora Mcgrath

## (undated) NOTE — LETTER
[facility] Testing Department  Phone: [phone]  Right Fax: [phone]  [facility] [place] 20 Vane Ordonez RN Date: 6/19/18    [facility]  Surgery Date: 6/21/2018    CSN: [id number]  Medical Record: [id number]

## (undated) NOTE — LETTER
05/07/18    Dear Dr. Jose Jackson      Thank you for referring your patient, Arcenio Damon to me for an evaluation. Please see my initial consult note enclosed below. Let me know if you have any questions.     Thank you  Roscoe Toro MD, Neurology  Ed[place] Her primary care provider recently ordered MRI brain with and without contrast but this has not been done yet.           Otherwise, patient denies any recent weight change, fevers, chills, nausea, double vision/ blurry vision / loss of vision, chest víctor Optic:    Pupils: equally round and reactive to light with direct and consensual responses, normal accomodation   Visual acuity: Normal              Visual fields: Normal  Oculomotor/Trochlear/Abducens:    Eye Movements: EOMI without nystagmus  Trigeminal: disease and/or other secondary myelopathy, with findings on exam with upper motor neuron signs, and weakness, which is asymmetric in the right lower extremity, suggestive of underlying myelopathy.   Her new onset of left sided neck pain, radiating the shoul Plan: MRI CERVICAL+THORACIC SPINE (ALL W+WO)         (CPT=72156/29212), MRI BRAIN (W+WO)         (CPT=70553), TIFFANY, DIRECT, REFLEX TO 9 ENAS,         C-REACTIVE PROTEIN, RHEUMATOID ARTHRITIS         FACTOR, SED RATE, WESTERGREN (AUTOMATED),         ASSAY, T